# Patient Record
Sex: FEMALE | Race: WHITE | Employment: FULL TIME | URBAN - METROPOLITAN AREA
[De-identification: names, ages, dates, MRNs, and addresses within clinical notes are randomized per-mention and may not be internally consistent; named-entity substitution may affect disease eponyms.]

---

## 2019-03-03 ENCOUNTER — APPOINTMENT (OUTPATIENT)
Dept: GENERAL RADIOLOGY | Age: 24
End: 2019-03-03
Payer: COMMERCIAL

## 2019-03-03 ENCOUNTER — HOSPITAL ENCOUNTER (EMERGENCY)
Age: 24
Discharge: HOME | End: 2019-03-03
Attending: EMERGENCY MEDICINE
Payer: COMMERCIAL

## 2019-03-03 VITALS
WEIGHT: 180 LBS | RESPIRATION RATE: 18 BRPM | TEMPERATURE: 97.2 F | OXYGEN SATURATION: 95 % | HEART RATE: 89 BPM | SYSTOLIC BLOOD PRESSURE: 115 MMHG | DIASTOLIC BLOOD PRESSURE: 68 MMHG

## 2019-03-03 DIAGNOSIS — J40 BRONCHITIS: Primary | ICD-10-CM

## 2019-03-03 LAB
DIRECT EXAM: NORMAL
Lab: NORMAL
SPECIMEN DESCRIPTION: NORMAL

## 2019-03-03 PROCEDURE — 87804 INFLUENZA ASSAY W/OPTIC: CPT

## 2019-03-03 PROCEDURE — 71046 X-RAY EXAM CHEST 2 VIEWS: CPT

## 2019-03-03 PROCEDURE — 99283 EMERGENCY DEPT VISIT LOW MDM: CPT

## 2019-03-03 RX ORDER — AZITHROMYCIN 250 MG/1
TABLET, FILM COATED ORAL
Qty: 1 PACKET | Refills: 0 | Status: SHIPPED | OUTPATIENT
Start: 2019-03-03 | End: 2019-03-13

## 2019-03-03 RX ORDER — BENZONATATE 100 MG/1
100 CAPSULE ORAL 3 TIMES DAILY PRN
Qty: 15 CAPSULE | Refills: 0 | Status: SHIPPED | OUTPATIENT
Start: 2019-03-03 | End: 2019-03-08

## 2019-03-04 ASSESSMENT — ENCOUNTER SYMPTOMS
ABDOMINAL PAIN: 0
SHORTNESS OF BREATH: 0
NAUSEA: 0
VOMITING: 0

## 2019-05-13 ENCOUNTER — APPOINTMENT (OUTPATIENT)
Dept: GENERAL RADIOLOGY | Age: 24
End: 2019-05-13
Payer: COMMERCIAL

## 2019-05-13 ENCOUNTER — HOSPITAL ENCOUNTER (EMERGENCY)
Age: 24
Discharge: HOME | End: 2019-05-13
Attending: EMERGENCY MEDICINE
Payer: COMMERCIAL

## 2019-05-13 VITALS
OXYGEN SATURATION: 96 % | HEART RATE: 98 BPM | SYSTOLIC BLOOD PRESSURE: 110 MMHG | TEMPERATURE: 99 F | HEIGHT: 61 IN | WEIGHT: 172 LBS | DIASTOLIC BLOOD PRESSURE: 75 MMHG | BODY MASS INDEX: 32.47 KG/M2

## 2019-05-13 DIAGNOSIS — J06.9 VIRAL UPPER RESPIRATORY INFECTION: Primary | ICD-10-CM

## 2019-05-13 LAB
DIRECT EXAM: NORMAL
Lab: NORMAL
SPECIMEN DESCRIPTION: NORMAL

## 2019-05-13 PROCEDURE — 87880 STREP A ASSAY W/OPTIC: CPT

## 2019-05-13 PROCEDURE — 71046 X-RAY EXAM CHEST 2 VIEWS: CPT

## 2019-05-13 PROCEDURE — 99284 EMERGENCY DEPT VISIT MOD MDM: CPT

## 2019-05-13 RX ORDER — ACETAMINOPHEN 325 MG/1
650 TABLET ORAL EVERY 6 HOURS PRN
Qty: 20 TABLET | Refills: 0 | Status: SHIPPED | OUTPATIENT
Start: 2019-05-13 | End: 2019-09-11

## 2019-05-13 RX ORDER — IBUPROFEN 600 MG/1
600 TABLET ORAL EVERY 6 HOURS PRN
Qty: 20 TABLET | Refills: 0 | Status: SHIPPED | OUTPATIENT
Start: 2019-05-13 | End: 2019-09-11

## 2019-05-13 RX ORDER — DOCUSATE SODIUM 100 MG/1
100 CAPSULE, LIQUID FILLED ORAL 2 TIMES DAILY PRN
Qty: 14 CAPSULE | Refills: 0 | Status: SHIPPED | OUTPATIENT
Start: 2019-05-13 | End: 2019-09-11

## 2019-05-13 ASSESSMENT — ENCOUNTER SYMPTOMS
COLOR CHANGE: 0
VOMITING: 0
SORE THROAT: 1
NAUSEA: 0
COUGH: 1
ABDOMINAL PAIN: 0
RHINORRHEA: 1
EYE REDNESS: 0
EYE PAIN: 0
SHORTNESS OF BREATH: 0
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
## 2019-08-05 ENCOUNTER — HOSPITAL ENCOUNTER (EMERGENCY)
Age: 24
Discharge: HOME | End: 2019-08-05
Attending: EMERGENCY MEDICINE
Payer: COMMERCIAL

## 2019-08-05 VITALS
BODY MASS INDEX: 34.01 KG/M2 | WEIGHT: 180 LBS | OXYGEN SATURATION: 98 % | DIASTOLIC BLOOD PRESSURE: 70 MMHG | TEMPERATURE: 98.2 F | RESPIRATION RATE: 16 BRPM | HEART RATE: 70 BPM | SYSTOLIC BLOOD PRESSURE: 130 MMHG

## 2019-08-05 DIAGNOSIS — R10.9 ABDOMINAL CRAMPING: Primary | ICD-10-CM

## 2019-08-05 LAB
-: ABNORMAL
AMORPHOUS: ABNORMAL
BACTERIA: ABNORMAL
BILIRUBIN URINE: NEGATIVE
CASTS UA: ABNORMAL /LPF (ref 0–8)
COLOR: YELLOW
COMMENT UA: ABNORMAL
CRYSTALS, UA: ABNORMAL /HPF
EPITHELIAL CELLS UA: ABNORMAL /HPF (ref 0–5)
GLUCOSE URINE: NEGATIVE
HCG QUANTITATIVE: ABNORMAL IU/L
KETONES, URINE: ABNORMAL
LEUKOCYTE ESTERASE, URINE: NEGATIVE
MUCUS: ABNORMAL
NITRITE, URINE: NEGATIVE
OTHER OBSERVATIONS UA: ABNORMAL
PH UA: 6 (ref 5–8)
PROTEIN UA: NEGATIVE
RBC UA: ABNORMAL /HPF (ref 0–4)
RENAL EPITHELIAL, UA: ABNORMAL /HPF
SPECIFIC GRAVITY UA: 1.03 (ref 1–1.03)
TRICHOMONAS: ABNORMAL
TURBIDITY: ABNORMAL
URINE HGB: NEGATIVE
UROBILINOGEN, URINE: NORMAL
WBC UA: ABNORMAL /HPF (ref 0–5)
YEAST: ABNORMAL

## 2019-08-05 PROCEDURE — 99284 EMERGENCY DEPT VISIT MOD MDM: CPT

## 2019-08-05 PROCEDURE — 84702 CHORIONIC GONADOTROPIN TEST: CPT

## 2019-08-05 PROCEDURE — 81001 URINALYSIS AUTO W/SCOPE: CPT

## 2019-08-05 PROCEDURE — 87086 URINE CULTURE/COLONY COUNT: CPT

## 2019-08-05 RX ORDER — PNV NO.95/FERROUS FUM/FOLIC AC 28MG-0.8MG
1 TABLET ORAL DAILY
Qty: 30 TABLET | Refills: 0 | Status: SHIPPED | OUTPATIENT
Start: 2019-08-05 | End: 2019-08-05 | Stop reason: SDUPTHER

## 2019-08-05 RX ORDER — PNV NO.95/FERROUS FUM/FOLIC AC 28MG-0.8MG
1 TABLET ORAL DAILY
Qty: 30 TABLET | Refills: 0 | Status: SHIPPED | OUTPATIENT
Start: 2019-08-05 | End: 2019-10-03 | Stop reason: ALTCHOICE

## 2019-08-05 RX ORDER — DIPHENHYDRAMINE HYDROCHLORIDE 25 MG/1
25 CAPSULE ORAL NIGHTLY PRN
Qty: 30 TABLET | Refills: 0 | Status: SHIPPED | OUTPATIENT
Start: 2019-08-05 | End: 2019-09-11

## 2019-08-05 RX ORDER — DIPHENHYDRAMINE HYDROCHLORIDE 25 MG/1
25 CAPSULE ORAL NIGHTLY PRN
Qty: 30 TABLET | Refills: 0 | Status: SHIPPED | OUTPATIENT
Start: 2019-08-05 | End: 2019-08-05 | Stop reason: SDUPTHER

## 2019-08-05 ASSESSMENT — ENCOUNTER SYMPTOMS
SORE THROAT: 0
COLOR CHANGE: 0
BACK PAIN: 0
VOMITING: 0
ABDOMINAL PAIN: 1
RHINORRHEA: 0
COUGH: 0
NAUSEA: 0
EYE DISCHARGE: 0
WHEEZING: 0
EYE PAIN: 0
EYE ITCHING: 0

## 2019-08-05 ASSESSMENT — PAIN DESCRIPTION - PAIN TYPE: TYPE: ACUTE PAIN

## 2019-08-05 ASSESSMENT — PAIN DESCRIPTION - FREQUENCY: FREQUENCY: CONTINUOUS

## 2019-08-05 ASSESSMENT — PAIN DESCRIPTION - ORIENTATION: ORIENTATION: LOWER

## 2019-08-05 ASSESSMENT — PAIN SCALES - GENERAL: PAINLEVEL_OUTOF10: 5

## 2019-08-05 ASSESSMENT — PAIN DESCRIPTION - ONSET: ONSET: ON-GOING

## 2019-08-05 ASSESSMENT — PAIN DESCRIPTION - DESCRIPTORS: DESCRIPTORS: CRAMPING

## 2019-08-05 ASSESSMENT — PAIN DESCRIPTION - LOCATION: LOCATION: ABDOMEN

## 2019-08-06 LAB
CULTURE: NORMAL
Lab: NORMAL
SPECIMEN DESCRIPTION: NORMAL

## 2019-08-06 NOTE — ED PROVIDER NOTES
I performed a history and physical examination of the patient and discussed management with the resident. I reviewed the residents note and agree with the documented findings and plan of care. Any areas of disagreement are noted on the chart. I was personally present for the key portions of any procedures. I have documented in the chart those procedures where I was not present during the key portions. I have reviewed the emergency nurses triage note. I agree with the chief complaint, past medical history, past surgical history, allergies, medications, social and family history as documented unless otherwise noted below. Documentation of the HPI, Physical Exam and Medical Decision Making performed by medical students or scribes is based on my personal performance of the HPI, PE and MDM. For Phys Assistant/ Nurse Practitioner cases/documentation I have personally evaluated this patient and have completed at least one if not all key elements of the E/M (history, physical exam, and MDM). I find the patient's history and physical exam are consistent with the NP/PA documentation. I agree with the care provided, treatment rendered, disposition and followup plan. Additional findings are as noted. Ralph Christianson. Jose Gonsalez MD  Attending Emergency  Physician    C/O INTERMITTENT MILD LOWER ABD CRAMPING, NONE CURRENTLY. HX OF PROB EARLY PREGNANCY-UNSURE OF LMP, BUT SUSPECTS IT MAY HAVE BEEN LATE . . NO HX OF TUBAL SURGERY/MANIPULATION, PID. NO NAUSEA, VOMITING, FEVER, CHILLS, UTI SX. NO VAG BLEEDING/DISCHARGE. NO SX AT TIME OF MY EVAL. AWAKE, ALERT, COOP, RESP. LUNGS CLEAR BERNARDO. NO RALES, RHONCHI, WHEEZES, STRIDOR, RETRACTIONS. CARDIAC-S1S2, RRR, NO MRG. ABD SOFT, NONDISTENDED, NONTENDER. NORMAL BOWEL SOUNDS. PATIENT DECLINED PELVIC EXAM.   FIRST PRENATAL VISIT WITH OBGYN IN [place]. POCUS-GESTATIONAL SAC? PROB IUP. IMP-PREGNANCY. PLAN-DISCHARGE, RX PRENATALVITAMINS, DICLEGIS COMPONENTS.  F/U WITH OB-CALL IN AM. RETURN IF SX WORSEN, RECUR, PROGRESS. Aurelia Moran MD  08/05/19 2018  [id number]   UNREMARKABLE.       Aurelia Moran MD  08/05/19 2052

## 2019-09-11 ENCOUNTER — INITIAL PRENATAL (OUTPATIENT)
Dept: OBGYN CLINIC | Age: 24
End: 2019-09-11
Payer: COMMERCIAL

## 2019-09-11 ENCOUNTER — PROCEDURE VISIT (OUTPATIENT)
Dept: OBGYN CLINIC | Age: 24
End: 2019-09-11
Payer: COMMERCIAL

## 2019-09-11 ENCOUNTER — HOSPITAL ENCOUNTER (OUTPATIENT)
Age: 24
Setting detail: SPECIMEN
Discharge: HOME | End: 2019-09-11
Payer: COMMERCIAL

## 2019-09-11 VITALS
BODY MASS INDEX: 31.74 KG/M2 | HEART RATE: 86 BPM | SYSTOLIC BLOOD PRESSURE: 114 MMHG | WEIGHT: 168 LBS | DIASTOLIC BLOOD PRESSURE: 70 MMHG

## 2019-09-11 DIAGNOSIS — Z98.890 HISTORY OF CRYOSURGERY OF CERVIX AFFECTING PREGNANCY, ANTEPARTUM: ICD-10-CM

## 2019-09-11 DIAGNOSIS — F32.A DEPRESSION, UNSPECIFIED DEPRESSION TYPE: ICD-10-CM

## 2019-09-11 DIAGNOSIS — Z34.90 EARLY STAGE OF PREGNANCY: ICD-10-CM

## 2019-09-11 DIAGNOSIS — F43.10 PTSD (POST-TRAUMATIC STRESS DISORDER): ICD-10-CM

## 2019-09-11 DIAGNOSIS — Z90.81 H/O SPLENECTOMY: ICD-10-CM

## 2019-09-11 DIAGNOSIS — Z87.828 HISTORY OF GUNSHOT WOUND: ICD-10-CM

## 2019-09-11 DIAGNOSIS — Z34.90 EARLY STAGE OF PREGNANCY: Primary | ICD-10-CM

## 2019-09-11 DIAGNOSIS — O34.40 HISTORY OF CRYOSURGERY OF CERVIX AFFECTING PREGNANCY, ANTEPARTUM: ICD-10-CM

## 2019-09-11 LAB
ABO/RH: NORMAL
ABSOLUTE EOS #: 0.2 K/UL (ref 0–0.4)
ABSOLUTE IMMATURE GRANULOCYTE: ABNORMAL K/UL (ref 0–0.3)
ABSOLUTE LYMPH #: 4.7 K/UL (ref 1–4.8)
ABSOLUTE MONO #: 1.5 K/UL (ref 0.1–1.3)
ANTIBODY SCREEN: NEGATIVE
BASOPHILS # BLD: 1 % (ref 0–2)
BASOPHILS ABSOLUTE: 0.2 K/UL (ref 0–0.2)
BILIRUBIN URINE: NEGATIVE
BLOOD BANK COMMENT: NORMAL
COLOR: YELLOW
COMMENT UA: NORMAL
CRL: NORMAL CM
DIFFERENTIAL TYPE: ABNORMAL
EOSINOPHILS RELATIVE PERCENT: 2 % (ref 0–4)
GLUCOSE BLD-MCNC: 89 MG/DL (ref 70–99)
GLUCOSE URINE: NEGATIVE
HCG QUANTITATIVE: ABNORMAL IU/L
HCT VFR BLD CALC: 40.6 % (ref 36–46)
HEMOGLOBIN: 13.5 G/DL (ref 12–16)
HEPATITIS B SURFACE ANTIGEN: NONREACTIVE
HIV AG/AB: NONREACTIVE
IMMATURE GRANULOCYTES: ABNORMAL %
KETONES, URINE: NEGATIVE
LEUKOCYTE ESTERASE, URINE: NEGATIVE
LYMPHOCYTES # BLD: 31 % (ref 24–44)
MCH RBC QN AUTO: 29.5 PG (ref 26–34)
MCHC RBC AUTO-ENTMCNC: 33.3 G/DL (ref 31–37)
MCV RBC AUTO: 88.5 FL (ref 80–100)
MONOCYTES # BLD: 9 % (ref 1–7)
NITRITE, URINE: NEGATIVE
NRBC AUTOMATED: ABNORMAL PER 100 WBC
PDW BLD-RTO: 13.3 % (ref 11.5–14.9)
PH UA: 6 (ref 5–8)
PLATELET # BLD: 344 K/UL (ref 150–450)
PLATELET ESTIMATE: ABNORMAL
PMV BLD AUTO: 10.5 FL (ref 6–12)
PROTEIN UA: NEGATIVE
RBC # BLD: 4.59 M/UL (ref 4–5.2)
RBC # BLD: ABNORMAL 10*6/UL
RUBV IGG SER QL: 83.8 IU/ML
SAC DIAMETER: NORMAL CM
SEG NEUTROPHILS: 57 % (ref 36–66)
SEGMENTED NEUTROPHILS ABSOLUTE COUNT: 8.9 K/UL (ref 1.3–9.1)
SPECIFIC GRAVITY UA: 1.02 (ref 1–1.03)
T. PALLIDUM, IGG: NONREACTIVE
TSH SERPL DL<=0.05 MIU/L-ACNC: 1.72 MIU/L (ref 0.3–5)
TURBIDITY: CLEAR
URINE HGB: NEGATIVE
UROBILINOGEN, URINE: NORMAL
WBC # BLD: 15.5 K/UL (ref 3.5–11)
WBC # BLD: ABNORMAL 10*3/UL

## 2019-09-11 PROCEDURE — 86900 BLOOD TYPING SEROLOGIC ABO: CPT

## 2019-09-11 PROCEDURE — 87491 CHLMYD TRACH DNA AMP PROBE: CPT

## 2019-09-11 PROCEDURE — 99213 OFFICE O/P EST LOW 20 MIN: CPT | Performed by: NURSE PRACTITIONER

## 2019-09-11 PROCEDURE — 86850 RBC ANTIBODY SCREEN: CPT

## 2019-09-11 PROCEDURE — 81220 CFTR GENE COM VARIANTS: CPT

## 2019-09-11 PROCEDURE — 84702 CHORIONIC GONADOTROPIN TEST: CPT

## 2019-09-11 PROCEDURE — 82947 ASSAY GLUCOSE BLOOD QUANT: CPT

## 2019-09-11 PROCEDURE — 87340 HEPATITIS B SURFACE AG IA: CPT

## 2019-09-11 PROCEDURE — 87070 CULTURE OTHR SPECIMN AEROBIC: CPT

## 2019-09-11 PROCEDURE — 76801 OB US < 14 WKS SINGLE FETUS: CPT | Performed by: OBSTETRICS & GYNECOLOGY

## 2019-09-11 PROCEDURE — 84443 ASSAY THYROID STIM HORMONE: CPT

## 2019-09-11 PROCEDURE — 87591 N.GONORRHOEAE DNA AMP PROB: CPT

## 2019-09-11 PROCEDURE — 85025 COMPLETE CBC W/AUTO DIFF WBC: CPT

## 2019-09-11 PROCEDURE — 86780 TREPONEMA PALLIDUM: CPT

## 2019-09-11 PROCEDURE — 81003 URINALYSIS AUTO W/O SCOPE: CPT

## 2019-09-11 PROCEDURE — 86901 BLOOD TYPING SEROLOGIC RH(D): CPT

## 2019-09-11 PROCEDURE — 87389 HIV-1 AG W/HIV-1&-2 AB AG IA: CPT

## 2019-09-11 PROCEDURE — 36415 COLL VENOUS BLD VENIPUNCTURE: CPT

## 2019-09-11 PROCEDURE — G0145 SCR C/V CYTO,THINLAYER,RESCR: HCPCS

## 2019-09-11 PROCEDURE — 86762 RUBELLA ANTIBODY: CPT

## 2019-09-12 LAB
C TRACH DNA GENITAL QL NAA+PROBE: NEGATIVE
N. GONORRHOEAE DNA: NEGATIVE
SPECIMEN DESCRIPTION: NORMAL

## 2019-09-14 LAB
CULTURE: NORMAL
CULTURE: NORMAL
Lab: NORMAL
SPECIMEN DESCRIPTION: NORMAL

## 2019-09-16 LAB — CYSTIC FIBROSIS: NORMAL

## 2019-09-20 LAB — CYTOLOGY REPORT: NORMAL

## 2019-09-23 ENCOUNTER — TELEPHONE (OUTPATIENT)
Dept: OBGYN CLINIC | Age: 24
End: 2019-09-23

## 2019-09-24 ENCOUNTER — TELEPHONE (OUTPATIENT)
Dept: OBGYN CLINIC | Age: 24
End: 2019-09-24

## 2019-09-24 ENCOUNTER — ROUTINE PRENATAL (OUTPATIENT)
Dept: PERINATAL CARE | Age: 24
End: 2019-09-24
Payer: COMMERCIAL

## 2019-09-24 VITALS
HEIGHT: 61 IN | RESPIRATION RATE: 16 BRPM | SYSTOLIC BLOOD PRESSURE: 105 MMHG | BODY MASS INDEX: 33.04 KG/M2 | WEIGHT: 175 LBS | HEART RATE: 68 BPM | TEMPERATURE: 98.3 F | DIASTOLIC BLOOD PRESSURE: 56 MMHG

## 2019-09-24 DIAGNOSIS — O99.211 OBESITY AFFECTING PREGNANCY IN FIRST TRIMESTER: ICD-10-CM

## 2019-09-24 DIAGNOSIS — O36.80X0 ENCOUNTER TO DETERMINE FETAL VIABILITY OF PREGNANCY, SINGLE OR UNSPECIFIED FETUS: ICD-10-CM

## 2019-09-24 DIAGNOSIS — Z36.9 FIRST TRIMESTER SCREENING: Primary | ICD-10-CM

## 2019-09-24 DIAGNOSIS — Z3A.13 13 WEEKS GESTATION OF PREGNANCY: ICD-10-CM

## 2019-09-24 LAB
CRL: NORMAL CM
SAC DIAMETER: NORMAL CM

## 2019-09-24 PROCEDURE — 76801 OB US < 14 WKS SINGLE FETUS: CPT | Performed by: OBSTETRICS & GYNECOLOGY

## 2019-09-24 PROCEDURE — 76813 OB US NUCHAL MEAS 1 GEST: CPT | Performed by: OBSTETRICS & GYNECOLOGY

## 2019-09-26 ENCOUNTER — TELEPHONE (OUTPATIENT)
Dept: PERINATAL CARE | Age: 24
End: 2019-09-26

## 2019-09-30 DIAGNOSIS — O28.9 ABNORMAL FIRST TRIMESTER SCREEN: Primary | ICD-10-CM

## 2019-10-03 ENCOUNTER — ROUTINE PRENATAL (OUTPATIENT)
Dept: PERINATAL CARE | Age: 24
End: 2019-10-03
Payer: COMMERCIAL

## 2019-10-03 ENCOUNTER — HOSPITAL ENCOUNTER (OUTPATIENT)
Age: 24
Discharge: HOME | End: 2019-10-03
Payer: COMMERCIAL

## 2019-10-03 VITALS
HEIGHT: 61 IN | HEART RATE: 72 BPM | DIASTOLIC BLOOD PRESSURE: 64 MMHG | SYSTOLIC BLOOD PRESSURE: 100 MMHG | WEIGHT: 177 LBS | BODY MASS INDEX: 33.42 KG/M2 | TEMPERATURE: 97.6 F | RESPIRATION RATE: 16 BRPM

## 2019-10-03 DIAGNOSIS — O99.212 OBESITY AFFECTING PREGNANCY IN SECOND TRIMESTER: ICD-10-CM

## 2019-10-03 DIAGNOSIS — O99.891 CURRENT MATERNAL CONDITION AFFECTING PREGNANCY: ICD-10-CM

## 2019-10-03 DIAGNOSIS — O28.0 LOW MATERNAL SERUM HUMAN CHORIONIC GONADOTROPIN (HCG): Primary | ICD-10-CM

## 2019-10-03 DIAGNOSIS — Z3A.15 15 WEEKS GESTATION OF PREGNANCY: ICD-10-CM

## 2019-10-03 DIAGNOSIS — O28.9 ABNORMAL FIRST TRIMESTER SCREEN: ICD-10-CM

## 2019-10-03 PROCEDURE — G8484 FLU IMMUNIZE NO ADMIN: HCPCS | Performed by: OBSTETRICS & GYNECOLOGY

## 2019-10-03 PROCEDURE — 36415 COLL VENOUS BLD VENIPUNCTURE: CPT

## 2019-10-03 PROCEDURE — 99243 OFF/OP CNSLTJ NEW/EST LOW 30: CPT | Performed by: OBSTETRICS & GYNECOLOGY

## 2019-10-03 PROCEDURE — G8417 CALC BMI ABV UP PARAM F/U: HCPCS | Performed by: OBSTETRICS & GYNECOLOGY

## 2019-10-03 PROCEDURE — 82105 ALPHA-FETOPROTEIN SERUM: CPT

## 2019-10-03 PROCEDURE — G8427 DOCREV CUR MEDS BY ELIG CLIN: HCPCS | Performed by: OBSTETRICS & GYNECOLOGY

## 2019-10-03 RX ORDER — VITAMIN A, VITAMIN C, VITAMIN D-3, VITAMIN E, VITAMIN B-1, VITAMIN B-2, NIACIN, VITAMIN B-6, CALCIUM, IRON, ZINC, COPPER 4000; 120; 400; 22; 1.84; 3; 20; 10; 1; 12; 200; 27; 25; 2 [IU]/1; MG/1; [IU]/1; MG/1; MG/1; MG/1; MG/1; MG/1; MG/1; UG/1; MG/1; MG/1; MG/1; MG/1
TABLET ORAL
Refills: 1 | COMMUNITY
Start: 2019-09-11 | End: 2019-12-24 | Stop reason: SDUPTHER

## 2019-10-04 LAB
SEND OUT REPORT: NORMAL
TEST NAME: NORMAL

## 2019-10-06 LAB
AFP INTERPRETATION: NORMAL
AFP MOM: 2.2
AFP SPECIMEN: NORMAL
AFP: 55 NG/ML
DATE OF BIRTH: NORMAL
DATING METHOD: NORMAL
DETERMINED BY: NORMAL
DIABETIC: NEGATIVE
DUE DATE: NORMAL
ESTIMATED DUE DATE: NORMAL
FAMILY HISTORY NTD: NEGATIVE
GESTATIONAL AGE: NORMAL
INSULIN REQ DIABETES: NO
LAST MENSTRUAL PERIOD: NORMAL
MATERNAL AGE AT EDD: 25.4 YR
MATERNAL WEIGHT: 177
MONOCHORIONIC TWINS: NORMAL
NUMBER OF FETUSES: NORMAL
PATIENT WEIGHT UNITS: NORMAL
PATIENT WEIGHT: NORMAL
RACE (MATERNAL): NORMAL
RACE: NORMAL
REPEAT SPECIMEN?: NORMAL
SMOKING: NORMAL
SMOKING: NORMAL
VALPROIC/CARBAMAZEP: NORMAL
ZZ NTE CLEAN UP: HISTORY: NO

## 2019-10-09 ENCOUNTER — ROUTINE PRENATAL (OUTPATIENT)
Dept: OBGYN CLINIC | Age: 24
End: 2019-10-09
Payer: COMMERCIAL

## 2019-10-09 VITALS
BODY MASS INDEX: 32.5 KG/M2 | SYSTOLIC BLOOD PRESSURE: 116 MMHG | WEIGHT: 172 LBS | HEART RATE: 71 BPM | DIASTOLIC BLOOD PRESSURE: 72 MMHG

## 2019-10-09 DIAGNOSIS — Z3A.15 15 WEEKS GESTATION OF PREGNANCY: Primary | ICD-10-CM

## 2019-10-09 DIAGNOSIS — Z98.890 HISTORY OF CRYOSURGERY OF CERVIX AFFECTING PREGNANCY, ANTEPARTUM: ICD-10-CM

## 2019-10-09 DIAGNOSIS — Z90.81 H/O SPLENECTOMY: ICD-10-CM

## 2019-10-09 DIAGNOSIS — Z87.828 HISTORY OF GUNSHOT WOUND: ICD-10-CM

## 2019-10-09 DIAGNOSIS — Z23 NEED FOR INFLUENZA VACCINATION: ICD-10-CM

## 2019-10-09 DIAGNOSIS — O34.40 HISTORY OF CRYOSURGERY OF CERVIX AFFECTING PREGNANCY, ANTEPARTUM: ICD-10-CM

## 2019-10-09 PROCEDURE — G8427 DOCREV CUR MEDS BY ELIG CLIN: HCPCS | Performed by: ADVANCED PRACTICE MIDWIFE

## 2019-10-09 PROCEDURE — 99213 OFFICE O/P EST LOW 20 MIN: CPT | Performed by: ADVANCED PRACTICE MIDWIFE

## 2019-10-09 PROCEDURE — 1036F TOBACCO NON-USER: CPT | Performed by: ADVANCED PRACTICE MIDWIFE

## 2019-10-09 PROCEDURE — G8484 FLU IMMUNIZE NO ADMIN: HCPCS | Performed by: ADVANCED PRACTICE MIDWIFE

## 2019-10-09 PROCEDURE — 90471 IMMUNIZATION ADMIN: CPT | Performed by: ADVANCED PRACTICE MIDWIFE

## 2019-10-09 PROCEDURE — 90686 IIV4 VACC NO PRSV 0.5 ML IM: CPT | Performed by: ADVANCED PRACTICE MIDWIFE

## 2019-10-09 PROCEDURE — G8417 CALC BMI ABV UP PARAM F/U: HCPCS | Performed by: ADVANCED PRACTICE MIDWIFE

## 2019-10-09 RX ORDER — METRONIDAZOLE 500 MG/1
500 TABLET ORAL 2 TIMES DAILY
Qty: 14 TABLET | Refills: 0 | Status: SHIPPED | OUTPATIENT
Start: 2019-10-09 | End: 2019-10-16

## 2019-10-11 ENCOUNTER — HOSPITAL ENCOUNTER (EMERGENCY)
Age: 24
Discharge: HOME | End: 2019-10-11
Attending: EMERGENCY MEDICINE
Payer: COMMERCIAL

## 2019-10-11 VITALS
SYSTOLIC BLOOD PRESSURE: 120 MMHG | TEMPERATURE: 97.5 F | BODY MASS INDEX: 33.07 KG/M2 | WEIGHT: 175 LBS | OXYGEN SATURATION: 100 % | HEART RATE: 72 BPM | DIASTOLIC BLOOD PRESSURE: 68 MMHG | RESPIRATION RATE: 18 BRPM

## 2019-10-11 DIAGNOSIS — M79.10 MYALGIA: Primary | ICD-10-CM

## 2019-10-11 LAB
-: ABNORMAL
AMORPHOUS: ABNORMAL
BACTERIA: ABNORMAL
BILIRUBIN URINE: NEGATIVE
CASTS UA: ABNORMAL /LPF (ref 0–2)
CASTS UA: ABNORMAL /LPF (ref 0–2)
COLOR: YELLOW
CRYSTALS, UA: ABNORMAL /HPF
CRYSTALS, UA: ABNORMAL /HPF
EPITHELIAL CELLS UA: ABNORMAL /HPF (ref 0–5)
GLUCOSE URINE: NEGATIVE
KETONES, URINE: NEGATIVE
LEUKOCYTE ESTERASE, URINE: NEGATIVE
MUCUS: ABNORMAL
NITRITE, URINE: NEGATIVE
OTHER OBSERVATIONS UA: ABNORMAL
PH UA: 5.5 (ref 5–8)
PROTEIN UA: NEGATIVE
RBC UA: ABNORMAL /HPF (ref 0–2)
RENAL EPITHELIAL, UA: ABNORMAL /HPF
SPECIFIC GRAVITY UA: 1.03 (ref 1–1.03)
TRICHOMONAS: ABNORMAL
TURBIDITY: CLEAR
URINE HGB: NEGATIVE
UROBILINOGEN, URINE: NORMAL
WBC UA: ABNORMAL /HPF (ref 0–5)
YEAST: ABNORMAL

## 2019-10-11 PROCEDURE — 99284 EMERGENCY DEPT VISIT MOD MDM: CPT

## 2019-10-11 PROCEDURE — 87086 URINE CULTURE/COLONY COUNT: CPT

## 2019-10-11 PROCEDURE — 81001 URINALYSIS AUTO W/SCOPE: CPT

## 2019-10-11 PROCEDURE — 6370000000 HC RX 637 (ALT 250 FOR IP): Performed by: STUDENT IN AN ORGANIZED HEALTH CARE EDUCATION/TRAINING PROGRAM

## 2019-10-11 RX ORDER — ACETAMINOPHEN 500 MG
1000 TABLET ORAL EVERY 6 HOURS PRN
Qty: 30 TABLET | Refills: 0 | Status: SHIPPED | OUTPATIENT
Start: 2019-10-11 | End: 2020-01-11 | Stop reason: ALTCHOICE

## 2019-10-11 RX ORDER — ACETAMINOPHEN 325 MG/1
650 TABLET ORAL ONCE
Status: COMPLETED | OUTPATIENT
Start: 2019-10-11 | End: 2019-10-11

## 2019-10-11 RX ADMIN — ACETAMINOPHEN 650 MG: 325 TABLET ORAL at 16:43

## 2019-10-11 ASSESSMENT — ENCOUNTER SYMPTOMS
COUGH: 0
SHORTNESS OF BREATH: 0
VOMITING: 1
NAUSEA: 1
SORE THROAT: 0
ABDOMINAL PAIN: 1

## 2019-10-11 ASSESSMENT — PAIN DESCRIPTION - ORIENTATION: ORIENTATION: LOWER

## 2019-10-11 ASSESSMENT — PAIN SCALES - GENERAL
PAINLEVEL_OUTOF10: 5
PAINLEVEL_OUTOF10: 5

## 2019-10-11 ASSESSMENT — PAIN DESCRIPTION - DESCRIPTORS: DESCRIPTORS: ACHING

## 2019-10-11 ASSESSMENT — PAIN DESCRIPTION - PAIN TYPE: TYPE: ACUTE PAIN

## 2019-10-11 ASSESSMENT — PAIN DESCRIPTION - LOCATION: LOCATION: ABDOMEN

## 2019-10-12 LAB
CULTURE: NORMAL
Lab: NORMAL
SPECIMEN DESCRIPTION: NORMAL

## 2019-10-22 ENCOUNTER — TELEPHONE (OUTPATIENT)
Dept: OBGYN CLINIC | Age: 24
End: 2019-10-22

## 2019-10-22 RX ORDER — ONDANSETRON 4 MG/1
4 TABLET, FILM COATED ORAL EVERY 8 HOURS PRN
Qty: 30 TABLET | Refills: 1 | Status: SHIPPED | OUTPATIENT
Start: 2019-10-22 | End: 2019-12-24

## 2019-10-28 ENCOUNTER — ROUTINE PRENATAL (OUTPATIENT)
Dept: OBGYN CLINIC | Age: 24
End: 2019-10-28
Payer: COMMERCIAL

## 2019-10-28 VITALS
BODY MASS INDEX: 32.69 KG/M2 | HEART RATE: 78 BPM | DIASTOLIC BLOOD PRESSURE: 70 MMHG | WEIGHT: 173 LBS | SYSTOLIC BLOOD PRESSURE: 114 MMHG

## 2019-10-28 DIAGNOSIS — Z90.81 H/O SPLENECTOMY: ICD-10-CM

## 2019-10-28 DIAGNOSIS — Z98.890 HISTORY OF CRYOSURGERY OF CERVIX AFFECTING PREGNANCY, ANTEPARTUM: ICD-10-CM

## 2019-10-28 DIAGNOSIS — Z3A.18 18 WEEKS GESTATION OF PREGNANCY: ICD-10-CM

## 2019-10-28 DIAGNOSIS — O09.92 ENCOUNTER FOR SUPERVISION OF HIGH RISK PREGNANCY IN SECOND TRIMESTER, ANTEPARTUM: Primary | ICD-10-CM

## 2019-10-28 DIAGNOSIS — Z87.828 HISTORY OF GUNSHOT WOUND: ICD-10-CM

## 2019-10-28 DIAGNOSIS — O34.40 HISTORY OF CRYOSURGERY OF CERVIX AFFECTING PREGNANCY, ANTEPARTUM: ICD-10-CM

## 2019-10-28 PROCEDURE — G8482 FLU IMMUNIZE ORDER/ADMIN: HCPCS | Performed by: CLINICAL NURSE SPECIALIST

## 2019-10-28 PROCEDURE — G8427 DOCREV CUR MEDS BY ELIG CLIN: HCPCS | Performed by: CLINICAL NURSE SPECIALIST

## 2019-10-28 PROCEDURE — 1036F TOBACCO NON-USER: CPT | Performed by: CLINICAL NURSE SPECIALIST

## 2019-10-28 PROCEDURE — G8417 CALC BMI ABV UP PARAM F/U: HCPCS | Performed by: CLINICAL NURSE SPECIALIST

## 2019-10-28 PROCEDURE — 99213 OFFICE O/P EST LOW 20 MIN: CPT | Performed by: CLINICAL NURSE SPECIALIST

## 2019-11-26 ENCOUNTER — ROUTINE PRENATAL (OUTPATIENT)
Dept: OBGYN CLINIC | Age: 24
End: 2019-11-26
Payer: COMMERCIAL

## 2019-11-26 VITALS
BODY MASS INDEX: 33.82 KG/M2 | DIASTOLIC BLOOD PRESSURE: 62 MMHG | SYSTOLIC BLOOD PRESSURE: 108 MMHG | WEIGHT: 179 LBS | HEART RATE: 76 BPM

## 2019-11-26 DIAGNOSIS — Z87.828 HISTORY OF GUNSHOT WOUND: ICD-10-CM

## 2019-11-26 DIAGNOSIS — Z98.890 HISTORY OF CRYOSURGERY OF CERVIX AFFECTING PREGNANCY, ANTEPARTUM: ICD-10-CM

## 2019-11-26 DIAGNOSIS — Z90.81 H/O SPLENECTOMY: ICD-10-CM

## 2019-11-26 DIAGNOSIS — O34.40 HISTORY OF CRYOSURGERY OF CERVIX AFFECTING PREGNANCY, ANTEPARTUM: ICD-10-CM

## 2019-11-26 PROCEDURE — 1036F TOBACCO NON-USER: CPT | Performed by: OBSTETRICS & GYNECOLOGY

## 2019-11-26 PROCEDURE — G8427 DOCREV CUR MEDS BY ELIG CLIN: HCPCS | Performed by: OBSTETRICS & GYNECOLOGY

## 2019-11-26 PROCEDURE — 99213 OFFICE O/P EST LOW 20 MIN: CPT | Performed by: OBSTETRICS & GYNECOLOGY

## 2019-11-26 PROCEDURE — G8482 FLU IMMUNIZE ORDER/ADMIN: HCPCS | Performed by: OBSTETRICS & GYNECOLOGY

## 2019-11-26 PROCEDURE — G8417 CALC BMI ABV UP PARAM F/U: HCPCS | Performed by: OBSTETRICS & GYNECOLOGY

## 2019-12-02 ENCOUNTER — TELEPHONE (OUTPATIENT)
Dept: OBGYN CLINIC | Age: 24
End: 2019-12-02

## 2019-12-02 RX ORDER — DOCUSATE SODIUM 100 MG/1
100 CAPSULE, LIQUID FILLED ORAL 2 TIMES DAILY
Qty: 60 CAPSULE | Refills: 0 | Status: SHIPPED | OUTPATIENT
Start: 2019-12-02 | End: 2019-12-24

## 2019-12-10 ENCOUNTER — ROUTINE PRENATAL (OUTPATIENT)
Dept: PERINATAL CARE | Age: 24
End: 2019-12-10
Payer: COMMERCIAL

## 2019-12-10 VITALS
HEIGHT: 61 IN | SYSTOLIC BLOOD PRESSURE: 112 MMHG | WEIGHT: 181 LBS | BODY MASS INDEX: 34.17 KG/M2 | TEMPERATURE: 97.9 F | DIASTOLIC BLOOD PRESSURE: 72 MMHG | HEART RATE: 76 BPM | RESPIRATION RATE: 16 BRPM

## 2019-12-10 DIAGNOSIS — O99.212 OBESITY AFFECTING PREGNANCY IN SECOND TRIMESTER: ICD-10-CM

## 2019-12-10 DIAGNOSIS — Z3A.24 24 WEEKS GESTATION OF PREGNANCY: ICD-10-CM

## 2019-12-10 DIAGNOSIS — O99.891 CURRENT MATERNAL CONDITION AFFECTING PREGNANCY: ICD-10-CM

## 2019-12-10 DIAGNOSIS — O28.9 ABNORMAL FIRST TRIMESTER SCREEN: ICD-10-CM

## 2019-12-10 DIAGNOSIS — O28.0 LOW MATERNAL SERUM HUMAN CHORIONIC GONADOTROPIN (HCG): Primary | ICD-10-CM

## 2019-12-10 PROCEDURE — 76820 UMBILICAL ARTERY ECHO: CPT | Performed by: OBSTETRICS & GYNECOLOGY

## 2019-12-10 PROCEDURE — 76811 OB US DETAILED SNGL FETUS: CPT | Performed by: OBSTETRICS & GYNECOLOGY

## 2019-12-24 ENCOUNTER — ROUTINE PRENATAL (OUTPATIENT)
Dept: OBGYN CLINIC | Age: 24
End: 2019-12-24
Payer: COMMERCIAL

## 2019-12-24 VITALS
HEART RATE: 78 BPM | DIASTOLIC BLOOD PRESSURE: 68 MMHG | BODY MASS INDEX: 33.82 KG/M2 | SYSTOLIC BLOOD PRESSURE: 112 MMHG | WEIGHT: 179 LBS

## 2019-12-24 DIAGNOSIS — Z3A.26 26 WEEKS GESTATION OF PREGNANCY: Primary | ICD-10-CM

## 2019-12-24 DIAGNOSIS — O34.40 HISTORY OF CRYOSURGERY OF CERVIX AFFECTING PREGNANCY, ANTEPARTUM: ICD-10-CM

## 2019-12-24 DIAGNOSIS — Z87.828 HISTORY OF GUNSHOT WOUND: ICD-10-CM

## 2019-12-24 DIAGNOSIS — Z90.81 H/O SPLENECTOMY: ICD-10-CM

## 2019-12-24 DIAGNOSIS — Z98.890 HISTORY OF CRYOSURGERY OF CERVIX AFFECTING PREGNANCY, ANTEPARTUM: ICD-10-CM

## 2019-12-24 PROCEDURE — 1036F TOBACCO NON-USER: CPT | Performed by: ADVANCED PRACTICE MIDWIFE

## 2019-12-24 PROCEDURE — 99213 OFFICE O/P EST LOW 20 MIN: CPT | Performed by: ADVANCED PRACTICE MIDWIFE

## 2019-12-24 PROCEDURE — G8482 FLU IMMUNIZE ORDER/ADMIN: HCPCS | Performed by: ADVANCED PRACTICE MIDWIFE

## 2019-12-24 PROCEDURE — G8427 DOCREV CUR MEDS BY ELIG CLIN: HCPCS | Performed by: ADVANCED PRACTICE MIDWIFE

## 2019-12-24 PROCEDURE — G8417 CALC BMI ABV UP PARAM F/U: HCPCS | Performed by: ADVANCED PRACTICE MIDWIFE

## 2019-12-24 RX ORDER — PRENATAL VIT,CAL 76/IRON/FOLIC 29 MG-1 MG
1 TABLET ORAL DAILY
Qty: 30 TABLET | Refills: 12 | Status: SHIPPED | OUTPATIENT
Start: 2019-12-24

## 2019-12-24 RX ORDER — METRONIDAZOLE 7.5 MG/G
GEL VAGINAL
Qty: 1 TUBE | Refills: 0 | Status: SHIPPED | OUTPATIENT
Start: 2019-12-24 | End: 2019-12-31

## 2019-12-30 ENCOUNTER — HOSPITAL ENCOUNTER (EMERGENCY)
Age: 24
Discharge: HOME | End: 2019-12-30
Attending: EMERGENCY MEDICINE
Payer: COMMERCIAL

## 2019-12-30 VITALS
WEIGHT: 179 LBS | HEIGHT: 61 IN | HEART RATE: 88 BPM | BODY MASS INDEX: 33.79 KG/M2 | DIASTOLIC BLOOD PRESSURE: 67 MMHG | OXYGEN SATURATION: 97 % | SYSTOLIC BLOOD PRESSURE: 123 MMHG | RESPIRATION RATE: 18 BRPM | TEMPERATURE: 97.8 F

## 2019-12-30 PROBLEM — E66.9 OBESITY: Status: ACTIVE | Noted: 2019-12-30

## 2019-12-30 PROBLEM — O28.9 ABNORMAL FIRST TRIMESTER SCREEN: Status: ACTIVE | Noted: 2019-12-30

## 2019-12-30 LAB
DIRECT EXAM: ABNORMAL
Lab: ABNORMAL
SPECIMEN DESCRIPTION: ABNORMAL

## 2019-12-30 PROCEDURE — 87804 INFLUENZA ASSAY W/OPTIC: CPT

## 2019-12-30 PROCEDURE — 6370000000 HC RX 637 (ALT 250 FOR IP): Performed by: STUDENT IN AN ORGANIZED HEALTH CARE EDUCATION/TRAINING PROGRAM

## 2019-12-30 PROCEDURE — 99284 EMERGENCY DEPT VISIT MOD MDM: CPT

## 2019-12-30 RX ORDER — ECHINACEA PURPUREA EXTRACT 125 MG
1 TABLET ORAL PRN
Qty: 1 BOTTLE | Refills: 3 | Status: SHIPPED | OUTPATIENT
Start: 2019-12-30 | End: 2020-02-13 | Stop reason: CLARIF

## 2019-12-30 RX ORDER — OSELTAMIVIR PHOSPHATE 75 MG/1
75 CAPSULE ORAL 2 TIMES DAILY
Qty: 10 CAPSULE | Refills: 0 | Status: SHIPPED | OUTPATIENT
Start: 2019-12-30 | End: 2020-01-04

## 2019-12-30 RX ORDER — ONDANSETRON 4 MG/1
4 TABLET, ORALLY DISINTEGRATING ORAL EVERY 8 HOURS PRN
Qty: 30 TABLET | Refills: 0 | Status: SHIPPED | OUTPATIENT
Start: 2019-12-30 | End: 2021-11-11

## 2019-12-30 RX ORDER — GUAIFENESIN 600 MG/1
600 TABLET, EXTENDED RELEASE ORAL 2 TIMES DAILY
Qty: 20 TABLET | Refills: 0 | Status: SHIPPED | OUTPATIENT
Start: 2019-12-30 | End: 2020-01-09

## 2019-12-30 RX ORDER — OSELTAMIVIR PHOSPHATE 6 MG/ML
75 FOR SUSPENSION ORAL ONCE
Status: COMPLETED | OUTPATIENT
Start: 2019-12-30 | End: 2019-12-30

## 2019-12-30 RX ORDER — BENZONATATE 100 MG/1
100 CAPSULE ORAL 2 TIMES DAILY PRN
Qty: 20 CAPSULE | Refills: 0 | Status: SHIPPED | OUTPATIENT
Start: 2019-12-30 | End: 2020-01-06

## 2019-12-30 RX ADMIN — OSELTAMIVIR PHOSPHATE 75 MG: 6 POWDER, FOR SUSPENSION ORAL at 05:44

## 2019-12-30 ASSESSMENT — ENCOUNTER SYMPTOMS
SHORTNESS OF BREATH: 0
VOMITING: 1
CONSTIPATION: 0
WHEEZING: 0
BACK PAIN: 0
COUGH: 1
DIARRHEA: 0
RHINORRHEA: 1
ABDOMINAL PAIN: 0
NAUSEA: 1
COLOR CHANGE: 0
CHEST TIGHTNESS: 0

## 2019-12-30 ASSESSMENT — PAIN SCALES - GENERAL: PAINLEVEL_OUTOF10: 6

## 2019-12-30 ASSESSMENT — PAIN DESCRIPTION - FREQUENCY: FREQUENCY: CONTINUOUS

## 2019-12-30 ASSESSMENT — PAIN DESCRIPTION - LOCATION: LOCATION: THROAT;HEAD

## 2019-12-30 NOTE — CONSULTS
Live Births   0 0 0 0 0 1      # Outcome Date GA Lbr Francisco J/2nd Weight Sex Delivery Anes PTL Lv   2 Current            1 Term 05/2012 40w0d  6 lb 5 oz (2.863 kg) F    JACKSON       PAST MEDICAL HISTORY:   has a past medical history of Depression and PTSD (post-traumatic stress disorder). PAST SURGICAL HISTORY:   has a past surgical history that includes Splenectomy (2014) and pelvic laparoscopy (2014). ALLERGIES:  Allergies as of 12/30/2019 - Review Complete 12/30/2019   Allergen Reaction Noted    Sulfamethoxazole-trimethoprim Hives 09/01/2017       MEDICATIONS:  No current facility-administered medications for this encounter. Current Outpatient Medications   Medication Sig Dispense Refill    sodium chloride (OCEAN) 0.65 % nasal spray 1 spray by Nasal route as needed for Congestion 1 Bottle 3    benzonatate (TESSALON) 100 MG capsule Take 1 capsule by mouth 2 times daily as needed for Cough 20 capsule 0    ondansetron (ZOFRAN ODT) 4 MG disintegrating tablet Place 1 tablet under the tongue every 8 hours as needed for Nausea or Vomiting 30 tablet 0    guaiFENesin (MUCINEX) 600 MG extended release tablet Take 1 tablet by mouth 2 times daily for 10 days 20 tablet 0    oseltamivir (TAMIFLU) 75 MG capsule Take 1 capsule by mouth 2 times daily for 5 days 10 capsule 0    Prenatal Vit-Iron Carbonyl-FA (PRENATABS RX) 29-1 MG TABS Take 1 tablet by mouth daily 30 tablet 12    metroNIDAZOLE (METROGEL VAGINAL) 0.75 % vaginal gel One applicator intravaginally every night for 5 days 1 Tube 0    acetaminophen (TYLENOL) 500 MG tablet Take 2 tablets by mouth every 6 hours as needed for Pain 30 tablet 0       FAMILY HISTORY:  family history is not on file. SOCIAL HISTORY:   reports that she has quit smoking. She has never used smokeless tobacco. She reports previous alcohol use. She reports that she does not use drugs.     ______________________________________________________________________ VITALS:  Vitals:    12/30/19 0404   BP: 123/67   Pulse: 88   Resp: 18   Temp: 97.8 °F (36.6 °C)   TempSrc: Oral   SpO2: 97%   Weight: 179 lb (81.2 kg)   Height: 5' 1\" (1.549 m)                                               INPUT/OUTPUT:  No intake/output data recorded. No intake/output data recorded. PHYSICAL EXAM:     General Appearance: Resting comfortably. Alert; in no acute distress. Pleasant. Skin: Skin color, texture, turgor normal. No rashes or lesions. Lymphatic: No abnormally enlarged lymph nodes. Neck and EENT: normal atraumatic, no neck masses, normal thyroid, no jvd  Respiratory: Normal expansion. Clear to auscultation. No rales, rhonchi, or wheezing. Coughing with deep inspiration. Cardiovascular: Regular rate and rhythm, no murmurs, rubs, or gallops  Abdomen: soft, non-tender, non-distended, no right upper quadrant tenderness and no CVA tenderness  Pelvic Exam: not indicated  Rectal Exam: not indicated  Musculoskeletal: no gross abnormalities  Extremities: non-tender BLE and non-edematous  Psych:  oriented to time, place and person, mood and affect are within normal limits     LAB RESULTS:  Recent Results (from the past 12 hour(s))   RAPID INFLUENZA A/B ANTIGENS    Collection Time: 12/30/19  4:30 AM   Result Value Ref Range    Specimen Description . NASOPHARYNGEAL SWAB     Special Requests NOT REPORTED     Direct Exam POSITIVE for Influenza B Antigen (A)     Direct Exam NEGATIVE for Influenza A Antigen     Direct Exam       Results reported to the appropriate Health Department    Direct Exam NETO YEH NOTIFIED  [facility]     Lab Results   Component Value Date    HCGQUANT 26,988 (H) 09/11/2019       Lab Results   Component Value Date    WBC 15.5 (H) 09/11/2019    HGB 13.5 09/11/2019    HCT 40.6 09/11/2019    MCV 88.5 09/11/2019     09/11/2019

## 2019-12-30 NOTE — ED PROVIDER NOTES
[facility]     Emergency Department     Faculty Attestation    I performed a history and physical examination of the patient and discussed management with the resident. I have reviewed and agree with the residents findings including all diagnostic interpretations, and treatment plans as written. Any areas of disagreement are noted on the chart. I was personally present for the key portions of any procedures. I have documented in the chart those procedures where I was not present during the key portions. I have reviewed the emergency nurses triage note. I agree with the chief complaint, past medical history, past surgical history, allergies, medications, social and family history as documented unless otherwise noted below. Documentation of the HPI, Physical Exam and Medical Decision Making performed by scribdaniel is based on my personal performance of the HPI, PE and MDM. For Physician Assistant/ Nurse Practitioner cases/documentation I have personally evaluated this patient and have completed at least one if not all key elements of the E/M (history, physical exam, and MDM). Additional findings are as noted. 27 weeks pregnant, feeling baby move, no cramping no vaginal bleeding or loss of fluid. Patient with cough, congestion, runny nose, fevers.     Tested +flu, patient to get tamiflu  Follows with Josesito Sims D.O, M.P.H  Attending Emergency Medicine Physician         Oz Lau, DO  12/30/19 3882

## 2019-12-30 NOTE — ED PROVIDER NOTES
[facility] ED  Emergency Department Encounter  Emergency Medicine Resident     Pt Name: Mary Jane Waite  MRN: [id number]  [place] 1995  Date of evaluation: 12/30/19  PCP:  Rupali Walls MD    [address] [place] [address]       Chief Complaint   Patient presents with    Cough    Pharyngitis       HISTORY OFPRESENT ILLNESS  (Location/Symptom, Timing/Onset, Context/Setting, Quality, Duration, Modifying Jeani Fester.)      Mary Jane Waite is a 21 y.o. female who presents with cough, congestion, rhinorrhea, difficulty breathing out of her nose. Patient is 27 weeks pregnant. Normal pregnancy so far. Patient is also had emesis, however is unable to discern if this is secondary to her pregnancy as she has had emesis throughout her pregnancy. Patient states she is drinking fluids, however has difficulty tolerating food. PAST MEDICAL / SURGICAL / SOCIAL / FAMILY HISTORY      has a past medical history of Depression and PTSD (post-traumatic stress disorder). has a past surgical history that includes Splenectomy (2014) and pelvic laparoscopy (2014).      Social History     Socioeconomic History    Marital status: Single     Spouse name: Not on file    Number of children: Not on file    Years of education: Not on file    Highest education level: Not on file   Occupational History    Not on file   Social Needs    Financial resource strain: Not on file    Food insecurity:     Worry: Not on file     Inability: Not on file    Transportation needs:     Medical: Not on file     Non-medical: Not on file   Tobacco Use    Smoking status: Former Smoker    Smokeless tobacco: Never Used   Substance and Sexual Activity    Alcohol use: Not Currently    Drug use: Never    Sexual activity: Yes     Partners: Male   Lifestyle    Physical activity:     Days per week: Not on file     Minutes per session: Not on file    Stress: Not on file   Relationships    Social connections:     Talks on phone: Not on file     Gets together: Not on file     Attends [employer] service: Not on file     Active member of club or organization: Not on file     Attends meetings of clubs or organizations: Not on file     Relationship status: Not on file    Intimate partner violence:     Fear of current or ex partner: Not on file     Emotionally abused: Not on file     Physically abused: Not on file     Forced sexual activity: Not on file   Other Topics Concern    Not on file   Social History Narrative    Not on file       History reviewed. No pertinent family history. Allergies:  Sulfamethoxazole-trimethoprim    Home Medications:  Prior to Admission medications    Medication Sig Start Date End Date Taking?  Authorizing Provider   sodium chloride (OCEAN) 0.65 % nasal spray 1 spray by Nasal route as needed for Congestion 12/30/19  Yes Sergio Yo DO   benzonatate (TESSALON) 100 MG capsule Take 1 capsule by mouth 2 times daily as needed for Cough 12/30/19 1/6/20 Yes Sergio Yo DO   ondansetron (ZOFRAN ODT) 4 MG disintegrating tablet Place 1 tablet under the tongue every 8 hours as needed for Nausea or Vomiting 12/30/19  Yes Sergio Yo DO   guaiFENesin (MUCINEX) 600 MG extended release tablet Take 1 tablet by mouth 2 times daily for 10 days 12/30/19 1/9/20 Yes Sergio Yo DO   oseltamivir (TAMIFLU) 75 MG capsule Take 1 capsule by mouth 2 times daily for 5 days 12/30/19 1/4/20 Yes Clemencia Warner DO   Prenatal Vit-Iron Carbonyl-FA (PRENATABS RX) 29-1 MG TABS Take 1 tablet by mouth daily 12/24/19   TORY Gibbs CNM   metroNIDAZOLE (METROGEL VAGINAL) 0.75 % vaginal gel One applicator intravaginally every night for 5 days 12/24/19 12/31/19  TORY Gibbs CNM   acetaminophen (TYLENOL) 500 MG tablet Take 2 tablets by mouth every 6 hours as needed for Pain 10/11/19   Elvis Jolly, DO       REVIEW OFSYSTEMS    (2-9 systems for level 4, 10 or more for level 5)      Review of Systems Abdominal:      General: Bowel sounds are normal. There is no distension. Palpations: Abdomen is soft. Tenderness: There is no tenderness. There is no guarding. Skin:     General: Skin is warm and dry. Capillary Refill: Capillary refill takes less than 2 seconds. Coloration: Skin is not pale. Findings: No erythema. Neurological:      General: No focal deficit present. Mental Status: She is alert and oriented to person, place, and time. Mental status is at baseline. Cranial Nerves: No cranial nerve deficit. Sensory: No sensory deficit. Psychiatric:         Mood and Affect: Mood normal.         Behavior: Behavior normal.         DIFFERENTIAL  DIAGNOSIS     PLAN (LABS / IMAGING / EKG):  Orders Placed This Encounter   Procedures    RAPID INFLUENZA A/B ANTIGENS    Inpatient consult to Obstetrics / Gynecology       MEDICATIONS ORDERED:  Orders Placed This Encounter   Medications    oseltamivir 6mg/ml (TAMIFLU) suspension 75 mg    sodium chloride (OCEAN) 0.65 % nasal spray     Si spray by Nasal route as needed for Congestion     Dispense:  1 Bottle     Refill:  3    benzonatate (TESSALON) 100 MG capsule     Sig: Take 1 capsule by mouth 2 times daily as needed for Cough     Dispense:  20 capsule     Refill:  0    ondansetron (ZOFRAN ODT) 4 MG disintegrating tablet     Sig: Place 1 tablet under the tongue every 8 hours as needed for Nausea or Vomiting     Dispense:  30 tablet     Refill:  0    guaiFENesin (MUCINEX) 600 MG extended release tablet     Sig: Take 1 tablet by mouth 2 times daily for 10 days     Dispense:  20 tablet     Refill:  0    oseltamivir (TAMIFLU) 75 MG capsule     Sig: Take 1 capsule by mouth 2 times daily for 5 days     Dispense:  10 capsule     Refill:  0       DDX: Viral URI, influenza    Initial MDM/Plan: 21 y.o. female who presents with cough, congestion, rhinorrhea, and emesis.   Patient is 27 weeks pregnant and is unknown if the emesis is related to the pregnancy or to her current illness. Patient overall no acute distress. No emesis since arrival.  Physical exam largely unremarkable. Lungs clear to auscultation bilaterally. Oral mucosa mildly dry. Will discuss patient with OB/GYN service. Rapid flu pending. DIAGNOSTIC RESULTS / EMERGENCYDEPARTMENT COURSE / MDM     LABS:  Labs Reviewed   RAPID INFLUENZA A/B ANTIGENS - Abnormal; Notable for the following components:       Result Value    Direct Exam POSITIVE for Influenza B Antigen (*)     All other components within normal limits         RADIOLOGY:  No results found. EMERGENCY DEPARTMENT COURSE:  ED Course as of Dec 30 0743   Mon Dec 30, 2019   5983 Bedside ultrasound performed with fetal heart rate of 142. [JG]      ED Course User Index  [JG] Cliff Dasilva,    Rapid flu positive. Discussed patient with OB/GYN, who came and evaluated patient. They are comfortable with discharge with appropriate follow-up. Patient to be given scripts for symptomatic management by them, and Tamiflu from the emergency department service. Patient comfortable discharge at this time. PROCEDURES:  None    CONSULTS:  IP CONSULT TO OB GYN    CRITICAL CARE:  Please see attending note    FINAL IMPRESSION      1.  Influenza with respiratory manifestation other than pneumonia          DISPOSITION / PLAN     DISPOSITION Decision To Discharge 12/30/2019 05:42:07 AM      PATIENT REFERRED TO:  Sp Waldron, [address] [place] [address] 90 100 [facility]    Schedule an appointment as soon as possible for a visit in 1 week  Hospital follow up    [facility] ED  [address] [facility] [id number] [phone]  Go to   If symptoms worsen      DISCHARGE MEDICATIONS:  Discharge Medication List as of 12/30/2019  5:43 AM      START taking these medications    Details   sodium chloride (OCEAN) 0.65 % nasal spray 1 spray by Nasal route as needed for Congestion,

## 2020-01-08 ENCOUNTER — ROUTINE PRENATAL (OUTPATIENT)
Dept: OBGYN CLINIC | Age: 25
End: 2020-01-08
Payer: COMMERCIAL

## 2020-01-08 VITALS
SYSTOLIC BLOOD PRESSURE: 102 MMHG | WEIGHT: 177 LBS | HEART RATE: 83 BPM | BODY MASS INDEX: 33.44 KG/M2 | DIASTOLIC BLOOD PRESSURE: 60 MMHG

## 2020-01-08 PROCEDURE — G8427 DOCREV CUR MEDS BY ELIG CLIN: HCPCS | Performed by: NURSE PRACTITIONER

## 2020-01-08 PROCEDURE — G8482 FLU IMMUNIZE ORDER/ADMIN: HCPCS | Performed by: NURSE PRACTITIONER

## 2020-01-08 PROCEDURE — 99213 OFFICE O/P EST LOW 20 MIN: CPT | Performed by: NURSE PRACTITIONER

## 2020-01-08 PROCEDURE — 1036F TOBACCO NON-USER: CPT | Performed by: NURSE PRACTITIONER

## 2020-01-08 PROCEDURE — G8417 CALC BMI ABV UP PARAM F/U: HCPCS | Performed by: NURSE PRACTITIONER
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
## 2020-01-11 ENCOUNTER — APPOINTMENT (OUTPATIENT)
Dept: CT IMAGING | Age: 25
End: 2020-01-11
Payer: COMMERCIAL

## 2020-01-11 ENCOUNTER — HOSPITAL ENCOUNTER (EMERGENCY)
Age: 25
Discharge: HOME | End: 2020-01-11
Attending: EMERGENCY MEDICINE
Payer: COMMERCIAL

## 2020-01-11 VITALS
HEIGHT: 61 IN | SYSTOLIC BLOOD PRESSURE: 124 MMHG | HEART RATE: 78 BPM | TEMPERATURE: 97.9 F | DIASTOLIC BLOOD PRESSURE: 70 MMHG | BODY MASS INDEX: 33.61 KG/M2 | RESPIRATION RATE: 12 BRPM | OXYGEN SATURATION: 96 % | WEIGHT: 178 LBS

## 2020-01-11 LAB
-: ABNORMAL
ABSOLUTE EOS #: 0.18 K/UL (ref 0–0.4)
ABSOLUTE IMMATURE GRANULOCYTE: ABNORMAL K/UL (ref 0–0.3)
ABSOLUTE LYMPH #: 4 K/UL (ref 1–4.8)
ABSOLUTE MONO #: 1.82 K/UL (ref 0.1–0.8)
ALBUMIN SERPL-MCNC: 3.3 G/DL (ref 3.5–5.2)
ALBUMIN/GLOBULIN RATIO: 0.9 (ref 1–2.5)
ALP BLD-CCNC: 128 U/L (ref 35–104)
ALT SERPL-CCNC: 8 U/L (ref 5–33)
AMORPHOUS: ABNORMAL
ANION GAP SERPL CALCULATED.3IONS-SCNC: 12 MMOL/L (ref 9–17)
AST SERPL-CCNC: 16 U/L
BACTERIA: ABNORMAL
BASOPHILS # BLD: 0 % (ref 0–2)
BASOPHILS ABSOLUTE: 0 K/UL (ref 0–0.2)
BILIRUB SERPL-MCNC: 0.13 MG/DL (ref 0.3–1.2)
BILIRUBIN URINE: NEGATIVE
BUN BLDV-MCNC: 14 MG/DL (ref 6–20)
BUN/CREAT BLD: ABNORMAL (ref 9–20)
CALCIUM SERPL-MCNC: 8.6 MG/DL (ref 8.6–10.4)
CASTS UA: ABNORMAL /LPF
CHLORIDE BLD-SCNC: 100 MMOL/L (ref 98–107)
CO2: 21 MMOL/L (ref 20–31)
COLOR: YELLOW
COMMENT UA: ABNORMAL
CREAT SERPL-MCNC: 0.41 MG/DL (ref 0.5–0.9)
CRYSTALS, UA: ABNORMAL /HPF
D-DIMER QUANTITATIVE: 1.72 MG/L FEU
DIFFERENTIAL TYPE: ABNORMAL
EKG ATRIAL RATE: 67 BPM
EKG P AXIS: 28 DEGREES
EKG P-R INTERVAL: 140 MS
EKG Q-T INTERVAL: 396 MS
EKG QRS DURATION: 82 MS
EKG QTC CALCULATION (BAZETT): 418 MS
EKG R AXIS: 80 DEGREES
EKG T AXIS: 38 DEGREES
EKG VENTRICULAR RATE: 67 BPM
EOSINOPHILS RELATIVE PERCENT: 1 % (ref 1–4)
EPITHELIAL CELLS UA: ABNORMAL /HPF (ref 0–5)
GFR AFRICAN AMERICAN: >60 ML/MIN
GFR NON-AFRICAN AMERICAN: >60 ML/MIN
GFR SERPL CREATININE-BSD FRML MDRD: ABNORMAL ML/MIN/{1.73_M2}
GFR SERPL CREATININE-BSD FRML MDRD: ABNORMAL ML/MIN/{1.73_M2}
GLUCOSE BLD-MCNC: 94 MG/DL (ref 70–99)
GLUCOSE URINE: NEGATIVE
HCT VFR BLD CALC: 32.6 % (ref 36–46)
HEMOGLOBIN: 10.5 G/DL (ref 12–16)
IMMATURE GRANULOCYTES: ABNORMAL %
KETONES, URINE: ABNORMAL
LEUKOCYTE ESTERASE, URINE: NEGATIVE
LIPASE: 22 U/L (ref 13–60)
LYMPHOCYTES # BLD: 22 % (ref 24–44)
MAGNESIUM: 1.8 MG/DL (ref 1.6–2.6)
MCH RBC QN AUTO: 28.6 PG (ref 26–34)
MCHC RBC AUTO-ENTMCNC: 32.2 G/DL (ref 31–37)
MCV RBC AUTO: 88.8 FL (ref 80–100)
METAMYELOCYTES ABSOLUTE COUNT: 0.18 K/UL
METAMYELOCYTES: 1 %
MONOCYTES # BLD: 10 % (ref 1–7)
MORPHOLOGY: NORMAL
MUCUS: ABNORMAL
MYELOCYTES ABSOLUTE COUNT: 0.18 K/UL
MYELOCYTES: 1 %
NITRITE, URINE: NEGATIVE
NRBC AUTOMATED: ABNORMAL PER 100 WBC
OTHER OBSERVATIONS UA: ABNORMAL
PDW BLD-RTO: 14.3 % (ref 12.5–15.4)
PH UA: 6 (ref 5–8)
PLATELET # BLD: 307 K/UL (ref 140–450)
PLATELET ESTIMATE: ABNORMAL
PMV BLD AUTO: 13.2 FL (ref 8–14)
POTASSIUM SERPL-SCNC: 3.9 MMOL/L (ref 3.7–5.3)
PROTEIN UA: NEGATIVE
RBC # BLD: 3.67 M/UL (ref 4–5.2)
RBC # BLD: ABNORMAL 10*6/UL
RBC UA: ABNORMAL /HPF (ref 0–2)
RENAL EPITHELIAL, UA: ABNORMAL /HPF
SEG NEUTROPHILS: 65 % (ref 36–66)
SEGMENTED NEUTROPHILS ABSOLUTE COUNT: 11.84 K/UL (ref 1.8–7.7)
SODIUM BLD-SCNC: 133 MMOL/L (ref 135–144)
SPECIFIC GRAVITY UA: 1.02 (ref 1–1.03)
TOTAL PROTEIN: 7.1 G/DL (ref 6.4–8.3)
TRICHOMONAS: ABNORMAL
TROPONIN INTERP: NORMAL
TROPONIN T: NORMAL NG/ML
TROPONIN, HIGH SENSITIVITY: <6 NG/L (ref 0–14)
TURBIDITY: CLEAR
URINE HGB: NEGATIVE
UROBILINOGEN, URINE: NORMAL
WBC # BLD: 18.2 K/UL (ref 3.5–11)
WBC # BLD: ABNORMAL 10*3/UL
WBC UA: ABNORMAL /HPF (ref 0–5)
YEAST: ABNORMAL

## 2020-01-11 PROCEDURE — 84484 ASSAY OF TROPONIN QUANT: CPT

## 2020-01-11 PROCEDURE — 2580000003 HC RX 258: Performed by: EMERGENCY MEDICINE

## 2020-01-11 PROCEDURE — 93005 ELECTROCARDIOGRAM TRACING: CPT

## 2020-01-11 PROCEDURE — 85025 COMPLETE CBC W/AUTO DIFF WBC: CPT

## 2020-01-11 PROCEDURE — 85379 FIBRIN DEGRADATION QUANT: CPT

## 2020-01-11 PROCEDURE — 83735 ASSAY OF MAGNESIUM: CPT

## 2020-01-11 PROCEDURE — 6360000004 HC RX CONTRAST MEDICATION: Performed by: EMERGENCY MEDICINE

## 2020-01-11 PROCEDURE — 87086 URINE CULTURE/COLONY COUNT: CPT

## 2020-01-11 PROCEDURE — 99284 EMERGENCY DEPT VISIT MOD MDM: CPT

## 2020-01-11 PROCEDURE — 80053 COMPREHEN METABOLIC PANEL: CPT

## 2020-01-11 PROCEDURE — 71260 CT THORAX DX C+: CPT

## 2020-01-11 PROCEDURE — 81001 URINALYSIS AUTO W/SCOPE: CPT

## 2020-01-11 PROCEDURE — 83690 ASSAY OF LIPASE: CPT

## 2020-01-11 PROCEDURE — 36415 COLL VENOUS BLD VENIPUNCTURE: CPT

## 2020-01-11 RX ORDER — 0.9 % SODIUM CHLORIDE 0.9 %
1000 INTRAVENOUS SOLUTION INTRAVENOUS ONCE
Status: COMPLETED | OUTPATIENT
Start: 2020-01-11 | End: 2020-01-11

## 2020-01-11 RX ORDER — 0.9 % SODIUM CHLORIDE 0.9 %
80 INTRAVENOUS SOLUTION INTRAVENOUS ONCE
Status: COMPLETED | OUTPATIENT
Start: 2020-01-11 | End: 2020-01-11

## 2020-01-11 RX ORDER — SODIUM CHLORIDE 0.9 % (FLUSH) 0.9 %
10 SYRINGE (ML) INJECTION PRN
Status: DISCONTINUED | OUTPATIENT
Start: 2020-01-11 | End: 2020-01-11 | Stop reason: HOSPADM

## 2020-01-11 RX ADMIN — IOVERSOL 75 ML: 741 INJECTION INTRA-ARTERIAL; INTRAVENOUS at 07:25

## 2020-01-11 RX ADMIN — SODIUM CHLORIDE 80 ML: 9 INJECTION, SOLUTION INTRAVENOUS at 07:26

## 2020-01-11 RX ADMIN — SODIUM CHLORIDE 1000 ML: 9 INJECTION, SOLUTION INTRAVENOUS at 05:54

## 2020-01-11 RX ADMIN — Medication 10 ML: at 07:26

## 2020-01-11 ASSESSMENT — ENCOUNTER SYMPTOMS
DIARRHEA: 0
BACK PAIN: 0
COUGH: 0
SORE THROAT: 0
EYE PAIN: 0
VOMITING: 0
RHINORRHEA: 0
SHORTNESS OF BREATH: 0
NAUSEA: 0
ABDOMINAL PAIN: 0

## 2020-01-11 NOTE — ED PROVIDER NOTES
both lungs. Respiratory motion. No lobar airspace consolidation. Parenchymal banding/atelectasis in the medial right middle lobe. Moderate elevation of the right hemidiaphragm. Upper Abdomen: Surgical clips along the left hepatic lobe. Mild fatty infiltration of the liver. Soft Tissues/Bones: No acute osseous abnormality. 1. No clear evidence for pulmonary embolus. 2. Mild dependent atelectasis. Respiratory motion. No lobar airspace consolidation. Parenchymal banding/atelectasis in the medial right middle lobe. 3. Moderate elevation of the right hemidiaphragm. 4. Mild fatty liver. Surgical clips along the left hepatic lobe.      LABS:   Results for orders placed or performed during the hospital encounter of 01/11/20   CBC Auto Differential   Result Value Ref Range    WBC 18.2 (H) 3.5 - 11.0 k/uL    RBC 3.67 (L) 4.0 - 5.2 m/uL    Hemoglobin 10.5 (L) 12.0 - 16.0 g/dL    Hematocrit 32.6 (L) 36 - 46 %    MCV 88.8 80 - 100 fL    MCH 28.6 26 - 34 pg    MCHC 32.2 31 - 37 g/dL    RDW 14.3 12.5 - 15.4 %    Platelets 677 128 - 790 k/uL    MPV 13.2 8.0 - 14.0 fL    NRBC Automated NOT REPORTED per 100 WBC    Differential Type NOT REPORTED     Immature Granulocytes NOT REPORTED 0 %    Absolute Immature Granulocyte NOT REPORTED 0.00 - 0.30 k/uL    WBC Morphology NOT REPORTED     RBC Morphology NOT REPORTED     Platelet Estimate NOT REPORTED     Seg Neutrophils 65 36 - 66 %    Lymphocytes 22 (L) 24 - 44 %    Monocytes 10 (H) 1 - 7 %    Eosinophils % 1 1 - 4 %    Basophils 0 0 - 2 %    Metamyelocytes 1 (H) 0 %    Myelocytes 1 (H) 0 %    Segs Absolute 11.84 (H) 1.8 - 7.7 k/uL    Absolute Lymph # 4.00 1.0 - 4.8 k/uL    Absolute Mono # 1.82 (H) 0.1 - 0.8 k/uL    Absolute Eos # 0.18 0.0 - 0.4 k/uL    Basophils Absolute 0.00 0.0 - 0.2 k/uL    Metamyelocytes Absolute 0.18 (H) 0 k/uL    Myelocytes Absolute 0.18 (H) 0 k/uL    Morphology Normal    Comprehensive Metabolic Panel   Result Value Ref Range    Glucose 94 70 - 99 mg/dL Interp NOT REPORTED    EKG 12 Lead   Result Value Ref Range    Ventricular Rate 67 BPM    Atrial Rate 67 BPM    P-R Interval 140 ms    QRS Duration 82 ms    Q-T Interval 396 ms    QTc Calculation (Bazett) 418 ms    P Axis 28 degrees    R Axis 80 degrees    T Axis 38 degrees       RECENT VITALS:  BP: 118/69, Temp: 97.9 °F (36.6 °C), Pulse: 77, Resp: 18     ED Course     The patient was given the following medications:  Orders Placed This Encounter   Medications    0.9 % sodium chloride bolus    sodium chloride flush 0.9 % injection 10 mL    0.9 % sodium chloride bolus    ioversol (OPTIRAY) 74 % injection 75 mL     During the emergency department course, patient was put on the monitor which revealed normal sinus rhythm. She was given normal saline bolus. She is feeling much better and resting comfortably. Medical Decision Making      44-year-old female patient G2, P1 about 29 weeks pregnant presents to the emergency department complaining of lightheadedness and dizziness. She admits to having shortness of breath upon exertion. She denies any chest pain, abdominal pain, vaginal bleeding or discharge. During the ED stay, patient is hemodynamically stable and neurologically intact. She has been resting comfortably. Rectal examination was performed which is Hemoccult negative. Plan is to discharge the patient with instructions drink plenty of oral fluids, continue current medications, follow-up with her PCP and her obstetrician, call their offices on Monday morning for appointment, return if worse. Disposition     FINAL IMPRESSION      1. Dizziness    2. Lightheadedness    3.  Anemia, unspecified type          DISPOSITION/PLAN   DISPOSITION Decision To Discharge 01/11/2020 08:31:17 AM      PATIENT REFERRED TO:  Abdulkadir Camarillo MD  [address] [place] [address],[address] [address]    Call in 2 days  For reevaluation of current symptoms    [facility], [address] [address], [phone]    Call in 2 days  For reevaluation of current symptoms    [facility] ED  [address] [address]  [phone]    If symptoms worsen      DISCHARGE MEDICATIONS:  New Prescriptions    No medications on file             (Please note that portions of this note were completed with a voice recognition program.  Efforts were made to edit the dictations but occasionally words are mis-transcribed.)    Parisi MD, F.A.C.E.P.   Attending Emergency Medicine Physician               Lola Nino MD  01/11/20 0344

## 2020-01-11 NOTE — ED PROVIDER NOTES
[address] [facility] ED  [address] [facility] [address]  Phone: [phone]  Fax: [phone] [facility] [place]          Pt Name: Beverley Oglesby  MRN: [id number]  [place] 1995  Date of evaluation: 1/11/2020      CHIEF COMPLAINT       Chief Complaint   Patient presents with    Dizziness     pt reports that she got dizzy and felt like she was going to pass out at work (pt \"just got over Influenza B)  Pt is 29 weeks pregnant       HISTORY OF PRESENT Mika Alvarez is a 21 y.o. female who presents with dizziness/near syncope. She reports that about a week ago is when the symptoms began but seem worse tonight while at work. She is on her feet much of the day as a  at [employer] and just came off work. States the symptoms are worse while she is working typically. Patient is approximately 34 weeks gestational age. G2, P1. No issues with her first pregnancy. Patient reports numerous similar symptoms throughout this pregnancy with the dizziness/nausea/lightheadedness. Denies chest pain or difficulty breathing. Patient does report fairly normal eating/oral intake. She denies any vaginal bleeding or discharge. Denies rhythmic abdominal discomfort but does report some mild tightening on occasion of her abdominal muscles. Not currently present. Seems to occur worse when she is exerting herself. She states she recently had influenza B and is getting over those symptoms. She states she found out she was pregnant about 11 weeks ago. Has had an ultrasound that showed no significant problems at this time. She also reports she has not yet had her Glucola test for diabetes. Patient denies any headaches or neurologic symptoms otherwise. Denies any dysuria or urinary symptoms. Her obstetrician is Dr. Dayne Garcia       Review of Systems   Constitutional: Negative for chills, fatigue and fever.    HENT: Negative for congestion, rhinorrhea and sore throat. Eyes: Negative for pain. Respiratory: Negative for cough and shortness of breath. Cardiovascular: Negative for chest pain. Gastrointestinal: Negative for abdominal pain, diarrhea, nausea and vomiting. Genitourinary: Negative for difficulty urinating, dysuria, frequency, hematuria, vaginal bleeding and vaginal discharge. Musculoskeletal: Negative for back pain and neck pain. Skin: Negative for rash. Neurological: Positive for dizziness and light-headedness. Negative for weakness and headaches. PAST MEDICAL HISTORY    has a past medical history of Depression and PTSD (post-traumatic stress disorder). SURGICAL HISTORY      has a past surgical history that includes Splenectomy (2014) and pelvic laparoscopy (2014). CURRENT MEDICATIONS       Discharge Medication List as of 1/11/2020  8:32 AM      CONTINUE these medications which have NOT CHANGED    Details   sodium chloride (OCEAN) 0.65 % nasal spray 1 spray by Nasal route as needed for Congestion, Disp-1 Bottle, R-3Print      ondansetron (ZOFRAN ODT) 4 MG disintegrating tablet Place 1 tablet under the tongue every 8 hours as needed for Nausea or Vomiting, Disp-30 tablet, R-0Print      Prenatal Vit-Iron Carbonyl-FA (PRENATABS RX) 29-1 MG TABS Take 1 tablet by mouth daily, Disp-30 tablet, R-12Normal             ALLERGIES     is allergic to sulfamethoxazole-trimethoprim. FAMILY HISTORY     has no family status information on file. family history is not on file. SOCIAL HISTORY      reports that she has quit smoking. She has never used smokeless tobacco. She reports previous alcohol use. She reports that she does not use drugs. PHYSICAL EXAM     INITIAL VITALS:  height is 5' 1\" (1.549 m) and weight is 178 lb (80.7 kg). Her oral temperature is 97.9 °F (36.6 °C). Her blood pressure is 124/70 and her pulse is 78. Her respiration is 12 and oxygen saturation is 96%.       Physical Exam Constitutional: She appears well-developed and well-nourished. Non-toxic appearance. She does not appear ill. No distress. HENT:   Head: Normocephalic and atraumatic. Right Ear: External ear normal.   Left Ear: External ear normal.   Eyes: EOM and lids are normal.   Neck: No tracheal deviation present. Cardiovascular: Normal rate and regular rhythm. Pulmonary/Chest: Effort normal and breath sounds normal. No respiratory distress. Abdominal: Soft. There is no tenderness. Benign abdominal exam.  No tenderness. Neurological: She is alert. GCS eye subscore is 4. GCS verbal subscore is 5. GCS motor subscore is 6. Skin: Skin is warm and dry. Psychiatric: She has a normal mood and affect. Her speech is normal.   Vitals reviewed. DIFFERENTIAL DIAGNOSIS/ MDM:     Plan at this time will be to check baseline labs and hydrate with fluids. May be dehydrated. Clinically she appears well and otherwise nontoxic. Vital signs are normal.    DIAGNOSTIC RESULTS     EKG: All EKG's are interpreted by the Emergency Department Physician who either signs or Co-signs this chart in the absence of a cardiologist.    Interpreted by No att. providers found     Rhythm: Sinus arrhythmia  Rate: normal  Axis: normal  Ectopy: none  Conduction: normal  ST Segments: no acute change  T Waves: no acute change    Clinical Impression:  Sinus arrhythmia rhythm with no acute changes/normal EKG. No acute infarction/ischemia noted. RADIOLOGY:   I directly visualized the following  images and reviewed the radiologist interpretations:  CT Chest Pulmonary Embolism W Contrast   Final Result   1. No clear evidence for pulmonary embolus. 2. Mild dependent atelectasis. Respiratory motion. No lobar airspace   consolidation. Parenchymal banding/atelectasis in the medial right middle   lobe. 3. Moderate elevation of the right hemidiaphragm. 4. Mild fatty liver. Surgical clips along the left hepatic lobe.              No results found.     LABS:  Results for orders placed or performed during the hospital encounter of 01/11/20   CBC Auto Differential   Result Value Ref Range    WBC 18.2 (H) 3.5 - 11.0 k/uL    RBC 3.67 (L) 4.0 - 5.2 m/uL    Hemoglobin 10.5 (L) 12.0 - 16.0 g/dL    Hematocrit 32.6 (L) 36 - 46 %    MCV 88.8 80 - 100 fL    MCH 28.6 26 - 34 pg    MCHC 32.2 31 - 37 g/dL    RDW 14.3 12.5 - 15.4 %    Platelets 175 248 - 699 k/uL    MPV 13.2 8.0 - 14.0 fL    NRBC Automated NOT REPORTED per 100 WBC    Differential Type NOT REPORTED     Immature Granulocytes NOT REPORTED 0 %    Absolute Immature Granulocyte NOT REPORTED 0.00 - 0.30 k/uL    WBC Morphology NOT REPORTED     RBC Morphology NOT REPORTED     Platelet Estimate NOT REPORTED     Seg Neutrophils 65 36 - 66 %    Lymphocytes 22 (L) 24 - 44 %    Monocytes 10 (H) 1 - 7 %    Eosinophils % 1 1 - 4 %    Basophils 0 0 - 2 %    Metamyelocytes 1 (H) 0 %    Myelocytes 1 (H) 0 %    Segs Absolute 11.84 (H) 1.8 - 7.7 k/uL    Absolute Lymph # 4.00 1.0 - 4.8 k/uL    Absolute Mono # 1.82 (H) 0.1 - 0.8 k/uL    Absolute Eos # 0.18 0.0 - 0.4 k/uL    Basophils Absolute 0.00 0.0 - 0.2 k/uL    Metamyelocytes Absolute 0.18 (H) 0 k/uL    Myelocytes Absolute 0.18 (H) 0 k/uL    Morphology Normal    Comprehensive Metabolic Panel   Result Value Ref Range    Glucose 94 70 - 99 mg/dL    BUN 14 6 - 20 mg/dL    CREATININE 0.41 (L) 0.50 - 0.90 mg/dL    Bun/Cre Ratio NOT REPORTED 9 - 20    Calcium 8.6 8.6 - 10.4 mg/dL    Sodium 133 (L) 135 - 144 mmol/L    Potassium 3.9 3.7 - 5.3 mmol/L    Chloride 100 98 - 107 mmol/L    CO2 21 20 - 31 mmol/L    Anion Gap 12 9 - 17 mmol/L    Alkaline Phosphatase 128 (H) 35 - 104 U/L    ALT 8 5 - 33 U/L    AST 16 <32 U/L    Total Bilirubin 0.13 (L) 0.3 - 1.2 mg/dL    Total Protein 7.1 6.4 - 8.3 g/dL    Alb 3.3 (L) 3.5 - 5.2 g/dL    Albumin/Globulin Ratio 0.9 (L) 1.0 - 2.5    GFR Non-African American >60 >60 mL/min    GFR African American >60 >60 mL/min    GFR Comment BP: 118/69 124/70   Pulse: 77 78   Resp: 18 12   Temp: 97.9 °F (36.6 °C)    TempSrc: Oral    SpO2: 96% 96%   Weight: 178 lb (80.7 kg)    Height: 5' 1\" (1.549 m)      -------------------------  BP: 124/70, Temp: 97.9 °F (36.6 °C), Pulse: 78, Resp: 12      Re-evaluation Notes    Patient signed over to Dr. Danilo De Leon. D-dimer is elevated. CT scan ordered. Discussed with the patient and she is comfortable and agreeable with the plan. Also has leukocytosis however she is getting over influenza and was also 15,000 in September. May be secondary to her pregnancy. CONSULTS:    None    CRITICAL CARE:     None    PROCEDURES:    None    FINAL IMPRESSION      1. Dizziness    2. Lightheadedness    3. Anemia, unspecified type          DISPOSITION/PLAN   DISPOSITION        Condition on Disposition    Improved    PATIENT REFERRED TO:  Gretchen Zhang MD  [address] [address]    Call in 2 days  For reevaluation of current symptoms    Flor Kingstonll, [address] [place] [address] 75, [address]  [address] [facility] [phone]    Call in 2 days  For reevaluation of current symptoms    [facility] ED  [address]   [address] [place] [address]  [phone]    If symptoms worsen      DISCHARGE MEDICATIONS:  Discharge Medication List as of 1/11/2020  8:32 AM          (Please note that portions of this note were completed with a voice recognition program.  Efforts were made to edit the dictations but occasionally words are mis-transcribed.)    Teto Doan DO  Attending Emergency Physician       Teto Doan,   01/11/20 [place] 0794, DO  01/12/20 2359

## 2020-01-11 NOTE — LETTER
[address] [facility] ED  [address] [place] [address]  Phone: [phone]  Fax: [phone]             January 11, 2020    Patient: Spenser Read   YOB: 1995   Date of Visit: 1/11/2020       To Whom It May Concern:    Kelby Moreira was seen and treated in our emergency department on 1/11/2020.  She may return to work on 112/2020      Sincerely,             Signature:__________________________________

## 2020-01-12 LAB
CULTURE: NORMAL
Lab: NORMAL
SPECIMEN DESCRIPTION: NORMAL

## 2020-01-20 ENCOUNTER — ROUTINE PRENATAL (OUTPATIENT)
Dept: OBGYN CLINIC | Age: 25
End: 2020-01-20
Payer: COMMERCIAL

## 2020-01-20 VITALS
BODY MASS INDEX: 33.82 KG/M2 | SYSTOLIC BLOOD PRESSURE: 104 MMHG | HEART RATE: 84 BPM | WEIGHT: 179 LBS | DIASTOLIC BLOOD PRESSURE: 60 MMHG

## 2020-01-20 PROCEDURE — 59425 ANTEPARTUM CARE ONLY: CPT | Performed by: OBSTETRICS & GYNECOLOGY

## 2020-01-20 PROCEDURE — 90715 TDAP VACCINE 7 YRS/> IM: CPT | Performed by: OBSTETRICS & GYNECOLOGY

## 2020-01-20 PROCEDURE — 90471 IMMUNIZATION ADMIN: CPT | Performed by: OBSTETRICS & GYNECOLOGY
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
## 2020-01-22 ENCOUNTER — ROUTINE PRENATAL (OUTPATIENT)
Dept: PERINATAL CARE | Age: 25
End: 2020-01-22
Payer: COMMERCIAL

## 2020-01-22 VITALS
BODY MASS INDEX: 33.99 KG/M2 | RESPIRATION RATE: 16 BRPM | HEART RATE: 72 BPM | DIASTOLIC BLOOD PRESSURE: 57 MMHG | WEIGHT: 180 LBS | TEMPERATURE: 97.7 F | SYSTOLIC BLOOD PRESSURE: 105 MMHG | HEIGHT: 61 IN

## 2020-01-22 PROCEDURE — 76819 FETAL BIOPHYS PROFIL W/O NST: CPT | Performed by: OBSTETRICS & GYNECOLOGY

## 2020-01-22 PROCEDURE — 76820 UMBILICAL ARTERY ECHO: CPT | Performed by: OBSTETRICS & GYNECOLOGY

## 2020-01-22 PROCEDURE — 99999 PR OFFICE/OUTPT VISIT,PROCEDURE ONLY: CPT | Performed by: OBSTETRICS & GYNECOLOGY

## 2020-01-22 PROCEDURE — 76816 OB US FOLLOW-UP PER FETUS: CPT | Performed by: OBSTETRICS & GYNECOLOGY

## 2020-01-29 ENCOUNTER — HOSPITAL ENCOUNTER (OUTPATIENT)
Age: 25
Discharge: HOME | End: 2020-01-29
Payer: COMMERCIAL

## 2020-01-29 PROBLEM — O24.419 GESTATIONAL DIABETES MELLITUS (GDM) AFFECTING PREGNANCY, ANTEPARTUM: Status: ACTIVE | Noted: 2020-01-29

## 2020-01-29 LAB
-: ABNORMAL
ABSOLUTE EOS #: 0.3 K/UL (ref 0–0.4)
ABSOLUTE IMMATURE GRANULOCYTE: ABNORMAL K/UL (ref 0–0.3)
ABSOLUTE LYMPH #: 3.3 K/UL (ref 1–4.8)
ABSOLUTE MONO #: 1.1 K/UL (ref 0.1–1.3)
AMORPHOUS: ABNORMAL
BACTERIA: ABNORMAL
BASOPHILS # BLD: 0 % (ref 0–2)
BASOPHILS ABSOLUTE: 0.1 K/UL (ref 0–0.2)
BILIRUBIN URINE: NEGATIVE
CASTS UA: ABNORMAL /LPF
COLOR: YELLOW
COMMENT UA: ABNORMAL
CRYSTALS, UA: ABNORMAL /HPF
DIFFERENTIAL TYPE: ABNORMAL
EOSINOPHILS RELATIVE PERCENT: 2 % (ref 0–4)
EPITHELIAL CELLS UA: ABNORMAL /HPF
GLUCOSE ADMINISTRATION: ABNORMAL
GLUCOSE TOLERANCE SCREEN 50G: 187 MG/DL (ref 70–135)
GLUCOSE URINE: NEGATIVE
HCT VFR BLD CALC: 33.3 % (ref 36–46)
HEMOGLOBIN: 11.1 G/DL (ref 12–16)
IMMATURE GRANULOCYTES: ABNORMAL %
KETONES, URINE: NEGATIVE
LEUKOCYTE ESTERASE, URINE: ABNORMAL
LYMPHOCYTES # BLD: 21 % (ref 24–44)
MCH RBC QN AUTO: 29 PG (ref 26–34)
MCHC RBC AUTO-ENTMCNC: 33.5 G/DL (ref 31–37)
MCV RBC AUTO: 86.6 FL (ref 80–100)
MONOCYTES # BLD: 7 % (ref 1–7)
MUCUS: ABNORMAL
NITRITE, URINE: NEGATIVE
NRBC AUTOMATED: ABNORMAL PER 100 WBC
OTHER OBSERVATIONS UA: ABNORMAL
PDW BLD-RTO: 13.6 % (ref 11.5–14.9)
PH UA: 6 (ref 5–8)
PLATELET # BLD: 270 K/UL (ref 150–450)
PLATELET ESTIMATE: ABNORMAL
PMV BLD AUTO: 10.5 FL (ref 6–12)
PROTEIN UA: NEGATIVE
RBC # BLD: 3.84 M/UL (ref 4–5.2)
RBC # BLD: ABNORMAL 10*6/UL
RBC UA: ABNORMAL /HPF
RENAL EPITHELIAL, UA: ABNORMAL /HPF
SEG NEUTROPHILS: 70 % (ref 36–66)
SEGMENTED NEUTROPHILS ABSOLUTE COUNT: 11.2 K/UL (ref 1.3–9.1)
SPECIFIC GRAVITY UA: 1.02 (ref 1–1.03)
TRICHOMONAS: ABNORMAL
TURBIDITY: ABNORMAL
URINE HGB: NEGATIVE
UROBILINOGEN, URINE: NORMAL
WBC # BLD: 16 K/UL (ref 3.5–11)
WBC # BLD: ABNORMAL 10*3/UL
WBC UA: ABNORMAL /HPF
YEAST: ABNORMAL

## 2020-01-29 PROCEDURE — 36415 COLL VENOUS BLD VENIPUNCTURE: CPT

## 2020-01-29 PROCEDURE — 81001 URINALYSIS AUTO W/SCOPE: CPT

## 2020-01-29 PROCEDURE — 82950 GLUCOSE TEST: CPT

## 2020-01-29 PROCEDURE — 87086 URINE CULTURE/COLONY COUNT: CPT

## 2020-01-29 PROCEDURE — 85025 COMPLETE CBC W/AUTO DIFF WBC: CPT

## 2020-01-30 ENCOUNTER — TELEPHONE (OUTPATIENT)
Dept: OBGYN CLINIC | Age: 25
End: 2020-01-30

## 2020-01-30 ENCOUNTER — TELEPHONE (OUTPATIENT)
Dept: PERINATAL CARE | Age: 25
End: 2020-01-30

## 2020-01-30 LAB
CULTURE: NORMAL
Lab: NORMAL
SPECIMEN DESCRIPTION: NORMAL

## 2020-02-04 ENCOUNTER — ROUTINE PRENATAL (OUTPATIENT)
Dept: OBGYN CLINIC | Age: 25
End: 2020-02-04
Payer: COMMERCIAL

## 2020-02-04 VITALS
DIASTOLIC BLOOD PRESSURE: 62 MMHG | HEART RATE: 84 BPM | BODY MASS INDEX: 34.58 KG/M2 | WEIGHT: 183 LBS | SYSTOLIC BLOOD PRESSURE: 104 MMHG

## 2020-02-04 PROCEDURE — G8417 CALC BMI ABV UP PARAM F/U: HCPCS | Performed by: OBSTETRICS & GYNECOLOGY

## 2020-02-04 PROCEDURE — G8482 FLU IMMUNIZE ORDER/ADMIN: HCPCS | Performed by: OBSTETRICS & GYNECOLOGY

## 2020-02-04 PROCEDURE — 99213 OFFICE O/P EST LOW 20 MIN: CPT | Performed by: OBSTETRICS & GYNECOLOGY

## 2020-02-04 PROCEDURE — 76818 FETAL BIOPHYS PROFILE W/NST: CPT | Performed by: OBSTETRICS & GYNECOLOGY

## 2020-02-04 PROCEDURE — G8427 DOCREV CUR MEDS BY ELIG CLIN: HCPCS | Performed by: OBSTETRICS & GYNECOLOGY

## 2020-02-04 PROCEDURE — 1036F TOBACCO NON-USER: CPT | Performed by: OBSTETRICS & GYNECOLOGY

## 2020-02-04 PROCEDURE — 2022F DILAT RTA XM EVC RTNOPTHY: CPT | Performed by: OBSTETRICS & GYNECOLOGY

## 2020-02-04 RX ORDER — GLUCOSAM/CHON-MSM1/C/MANG/BOSW 500-416.6
TABLET ORAL
Status: ON HOLD | COMMUNITY
Start: 2020-01-31 | End: 2020-03-20 | Stop reason: HOSPADM

## 2020-02-04 RX ORDER — DIPHENHYDRAMINE HCL 25 MG
TABLET ORAL
Status: ON HOLD | COMMUNITY
Start: 2020-01-31 | End: 2020-03-20 | Stop reason: HOSPADM

## 2020-02-04 RX ORDER — METRONIDAZOLE 500 MG/1
500 TABLET ORAL 2 TIMES DAILY
Qty: 14 TABLET | Refills: 0 | Status: SHIPPED | OUTPATIENT
Start: 2020-02-04 | End: 2020-02-11

## 2020-02-04 RX ORDER — CALCIUM CITRATE/VITAMIN D3 200MG-6.25
TABLET ORAL
Status: ON HOLD | COMMUNITY
Start: 2020-01-31 | End: 2020-03-20 | Stop reason: HOSPADM
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
## 2020-02-10 ENCOUNTER — HOSPITAL ENCOUNTER (OUTPATIENT)
Age: 25
Discharge: HOME | End: 2020-02-10
Attending: OBSTETRICS & GYNECOLOGY | Admitting: OBSTETRICS & GYNECOLOGY
Payer: COMMERCIAL

## 2020-02-10 VITALS
SYSTOLIC BLOOD PRESSURE: 113 MMHG | TEMPERATURE: 97.5 F | DIASTOLIC BLOOD PRESSURE: 61 MMHG | RESPIRATION RATE: 16 BRPM | HEART RATE: 84 BPM

## 2020-02-10 PROBLEM — Z3A.33 33 WEEKS GESTATION OF PREGNANCY: Status: ACTIVE | Noted: 2020-02-10

## 2020-02-10 LAB
-: NORMAL
AMORPHOUS: NORMAL
BACTERIA: NORMAL
BILIRUBIN URINE: NEGATIVE
CASTS UA: NORMAL /LPF (ref 0–8)
COLOR: YELLOW
COMMENT UA: ABNORMAL
CRYSTALS, UA: NORMAL /HPF
DIRECT EXAM: NORMAL
EPITHELIAL CELLS UA: NORMAL /HPF (ref 0–5)
GLUCOSE BLD-MCNC: 91 MG/DL (ref 65–105)
GLUCOSE URINE: NEGATIVE
KETONES, URINE: NEGATIVE
LEUKOCYTE ESTERASE, URINE: ABNORMAL
Lab: NORMAL
MUCUS: NORMAL
NITRITE, URINE: NEGATIVE
OTHER OBSERVATIONS UA: NORMAL
PH UA: 6.5 (ref 5–8)
PROTEIN UA: NEGATIVE
RBC UA: NORMAL /HPF (ref 0–4)
RENAL EPITHELIAL, UA: NORMAL /HPF
SPECIFIC GRAVITY UA: 1.03 (ref 1–1.03)
SPECIMEN DESCRIPTION: NORMAL
TRICHOMONAS: NORMAL
TURBIDITY: CLEAR
URINE HGB: ABNORMAL
UROBILINOGEN, URINE: NORMAL
WBC UA: NORMAL /HPF (ref 0–5)
YEAST: NORMAL

## 2020-02-10 PROCEDURE — 87086 URINE CULTURE/COLONY COUNT: CPT

## 2020-02-10 PROCEDURE — 6370000000 HC RX 637 (ALT 250 FOR IP): Performed by: STUDENT IN AN ORGANIZED HEALTH CARE EDUCATION/TRAINING PROGRAM

## 2020-02-10 PROCEDURE — 99213 OFFICE O/P EST LOW 20 MIN: CPT

## 2020-02-10 PROCEDURE — 87591 N.GONORRHOEAE DNA AMP PROB: CPT

## 2020-02-10 PROCEDURE — 87510 GARDNER VAG DNA DIR PROBE: CPT

## 2020-02-10 PROCEDURE — 87491 CHLMYD TRACH DNA AMP PROBE: CPT

## 2020-02-10 PROCEDURE — 82947 ASSAY GLUCOSE BLOOD QUANT: CPT

## 2020-02-10 PROCEDURE — 87660 TRICHOMONAS VAGIN DIR PROBE: CPT

## 2020-02-10 PROCEDURE — 87480 CANDIDA DNA DIR PROBE: CPT

## 2020-02-10 PROCEDURE — 81001 URINALYSIS AUTO W/SCOPE: CPT

## 2020-02-10 RX ORDER — ONDANSETRON 4 MG/1
4 TABLET, ORALLY DISINTEGRATING ORAL ONCE
Status: COMPLETED | OUTPATIENT
Start: 2020-02-10 | End: 2020-02-10

## 2020-02-10 RX ORDER — ACETAMINOPHEN 500 MG
1000 TABLET ORAL EVERY 6 HOURS PRN
Status: DISCONTINUED | OUTPATIENT
Start: 2020-02-10 | End: 2020-02-11 | Stop reason: HOSPADM

## 2020-02-10 RX ADMIN — ONDANSETRON 4 MG: 4 TABLET, ORALLY DISINTEGRATING ORAL at 21:27

## 2020-02-11 ENCOUNTER — ROUTINE PRENATAL (OUTPATIENT)
Dept: OBGYN CLINIC | Age: 25
End: 2020-02-11
Payer: COMMERCIAL

## 2020-02-11 VITALS
HEART RATE: 76 BPM | SYSTOLIC BLOOD PRESSURE: 109 MMHG | DIASTOLIC BLOOD PRESSURE: 58 MMHG | WEIGHT: 183 LBS | BODY MASS INDEX: 34.58 KG/M2

## 2020-02-11 PROBLEM — O98.219 GONORRHEA IN PREGNANCY: Status: ACTIVE | Noted: 2020-02-11

## 2020-02-11 LAB
C TRACH DNA GENITAL QL NAA+PROBE: NEGATIVE
CULTURE: NORMAL
Lab: NORMAL
N. GONORRHOEAE DNA: ABNORMAL
SPECIMEN DESCRIPTION: ABNORMAL
SPECIMEN DESCRIPTION: NORMAL

## 2020-02-11 PROCEDURE — G8427 DOCREV CUR MEDS BY ELIG CLIN: HCPCS | Performed by: NURSE PRACTITIONER

## 2020-02-11 PROCEDURE — 1036F TOBACCO NON-USER: CPT | Performed by: NURSE PRACTITIONER

## 2020-02-11 PROCEDURE — 2022F DILAT RTA XM EVC RTNOPTHY: CPT | Performed by: NURSE PRACTITIONER

## 2020-02-11 PROCEDURE — G8417 CALC BMI ABV UP PARAM F/U: HCPCS | Performed by: NURSE PRACTITIONER

## 2020-02-11 PROCEDURE — G8482 FLU IMMUNIZE ORDER/ADMIN: HCPCS | Performed by: NURSE PRACTITIONER

## 2020-02-11 PROCEDURE — 59025 FETAL NON-STRESS TEST: CPT | Performed by: NURSE PRACTITIONER

## 2020-02-11 PROCEDURE — 99213 OFFICE O/P EST LOW 20 MIN: CPT | Performed by: NURSE PRACTITIONER

## 2020-02-11 RX ORDER — CEFTRIAXONE SODIUM 250 MG/1
250 INJECTION, POWDER, FOR SOLUTION INTRAMUSCULAR; INTRAVENOUS ONCE
Status: COMPLETED | OUTPATIENT
Start: 2020-02-11 | End: 2020-02-11

## 2020-02-11 RX ADMIN — CEFTRIAXONE SODIUM 250 MG: 250 INJECTION, POWDER, FOR SOLUTION INTRAMUSCULAR; INTRAVENOUS at 13:00
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
## 2020-02-11 NOTE — FLOWSHEET NOTE
Pt arrived to labor and delivery with complaints of cramping and pressure in the lower abd for about a day. Pt states she has also been dry heaving, sweating and shaking. Every time she eats she feels \"sick\". Pt denies any vaginal discharge, bleeding. No recent sick contact. Positive fetal movment.

## 2020-02-13 ENCOUNTER — ROUTINE PRENATAL (OUTPATIENT)
Dept: PERINATAL CARE | Age: 25
End: 2020-02-13
Payer: COMMERCIAL

## 2020-02-13 VITALS
WEIGHT: 185 LBS | SYSTOLIC BLOOD PRESSURE: 118 MMHG | DIASTOLIC BLOOD PRESSURE: 56 MMHG | HEIGHT: 61 IN | TEMPERATURE: 97.7 F | BODY MASS INDEX: 34.93 KG/M2 | RESPIRATION RATE: 16 BRPM | HEART RATE: 66 BPM

## 2020-02-13 PROCEDURE — 76819 FETAL BIOPHYS PROFIL W/O NST: CPT | Performed by: OBSTETRICS & GYNECOLOGY

## 2020-02-13 PROCEDURE — 76805 OB US >/= 14 WKS SNGL FETUS: CPT | Performed by: OBSTETRICS & GYNECOLOGY

## 2020-02-13 PROCEDURE — 76820 UMBILICAL ARTERY ECHO: CPT | Performed by: OBSTETRICS & GYNECOLOGY

## 2020-02-13 RX ORDER — AZITHROMYCIN 500 MG/1
1000 TABLET, FILM COATED ORAL ONCE
Qty: 2 TABLET | Refills: 0 | Status: SHIPPED | OUTPATIENT
Start: 2020-02-13 | End: 2020-02-13

## 2020-02-13 RX ORDER — CEFTRIAXONE 1 G/1
250 INJECTION, POWDER, FOR SOLUTION INTRAMUSCULAR; INTRAVENOUS ONCE
Qty: 0.25 G | Refills: 0 | Status: SHIPPED | OUTPATIENT
Start: 2020-02-13 | End: 2020-02-13 | Stop reason: CLARIF

## 2020-02-17 ENCOUNTER — ROUTINE PRENATAL (OUTPATIENT)
Dept: OBGYN CLINIC | Age: 25
End: 2020-02-17
Payer: COMMERCIAL

## 2020-02-17 VITALS
DIASTOLIC BLOOD PRESSURE: 62 MMHG | WEIGHT: 186 LBS | HEART RATE: 79 BPM | SYSTOLIC BLOOD PRESSURE: 104 MMHG | BODY MASS INDEX: 35.14 KG/M2

## 2020-02-17 PROCEDURE — 2022F DILAT RTA XM EVC RTNOPTHY: CPT | Performed by: NURSE PRACTITIONER

## 2020-02-17 PROCEDURE — G8427 DOCREV CUR MEDS BY ELIG CLIN: HCPCS | Performed by: NURSE PRACTITIONER

## 2020-02-17 PROCEDURE — 59025 FETAL NON-STRESS TEST: CPT | Performed by: NURSE PRACTITIONER

## 2020-02-17 PROCEDURE — 99213 OFFICE O/P EST LOW 20 MIN: CPT | Performed by: NURSE PRACTITIONER

## 2020-02-17 PROCEDURE — G8482 FLU IMMUNIZE ORDER/ADMIN: HCPCS | Performed by: NURSE PRACTITIONER

## 2020-02-17 PROCEDURE — G8417 CALC BMI ABV UP PARAM F/U: HCPCS | Performed by: NURSE PRACTITIONER

## 2020-02-17 PROCEDURE — 1036F TOBACCO NON-USER: CPT | Performed by: NURSE PRACTITIONER
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
## 2020-02-20 ENCOUNTER — ROUTINE PRENATAL (OUTPATIENT)
Dept: PERINATAL CARE | Age: 25
End: 2020-02-20
Payer: COMMERCIAL

## 2020-02-20 VITALS
SYSTOLIC BLOOD PRESSURE: 111 MMHG | HEIGHT: 61 IN | DIASTOLIC BLOOD PRESSURE: 61 MMHG | RESPIRATION RATE: 16 BRPM | TEMPERATURE: 97.8 F | WEIGHT: 186 LBS | BODY MASS INDEX: 35.12 KG/M2 | HEART RATE: 82 BPM

## 2020-02-20 PROCEDURE — G8417 CALC BMI ABV UP PARAM F/U: HCPCS | Performed by: OBSTETRICS & GYNECOLOGY

## 2020-02-20 PROCEDURE — G8427 DOCREV CUR MEDS BY ELIG CLIN: HCPCS | Performed by: OBSTETRICS & GYNECOLOGY

## 2020-02-20 PROCEDURE — 76820 UMBILICAL ARTERY ECHO: CPT | Performed by: OBSTETRICS & GYNECOLOGY

## 2020-02-20 PROCEDURE — 99242 OFF/OP CONSLTJ NEW/EST SF 20: CPT | Performed by: OBSTETRICS & GYNECOLOGY

## 2020-02-20 PROCEDURE — 76818 FETAL BIOPHYS PROFILE W/NST: CPT | Performed by: OBSTETRICS & GYNECOLOGY

## 2020-02-20 PROCEDURE — G8482 FLU IMMUNIZE ORDER/ADMIN: HCPCS | Performed by: OBSTETRICS & GYNECOLOGY

## 2020-02-24 ENCOUNTER — ROUTINE PRENATAL (OUTPATIENT)
Dept: OBGYN CLINIC | Age: 25
End: 2020-02-24
Payer: COMMERCIAL

## 2020-02-24 ENCOUNTER — HOSPITAL ENCOUNTER (OUTPATIENT)
Age: 25
Setting detail: SPECIMEN
Discharge: HOME | End: 2020-02-24
Payer: COMMERCIAL

## 2020-02-24 VITALS
HEART RATE: 86 BPM | BODY MASS INDEX: 35.14 KG/M2 | SYSTOLIC BLOOD PRESSURE: 110 MMHG | DIASTOLIC BLOOD PRESSURE: 67 MMHG | WEIGHT: 186 LBS

## 2020-02-24 LAB
DIRECT EXAM: NORMAL
Lab: NORMAL
SPECIMEN DESCRIPTION: NORMAL

## 2020-02-24 PROCEDURE — 36415 COLL VENOUS BLD VENIPUNCTURE: CPT

## 2020-02-24 PROCEDURE — 99213 OFFICE O/P EST LOW 20 MIN: CPT | Performed by: ADVANCED PRACTICE MIDWIFE

## 2020-02-24 PROCEDURE — 87660 TRICHOMONAS VAGIN DIR PROBE: CPT

## 2020-02-24 PROCEDURE — 87591 N.GONORRHOEAE DNA AMP PROB: CPT

## 2020-02-24 PROCEDURE — G8427 DOCREV CUR MEDS BY ELIG CLIN: HCPCS | Performed by: ADVANCED PRACTICE MIDWIFE

## 2020-02-24 PROCEDURE — 87510 GARDNER VAG DNA DIR PROBE: CPT

## 2020-02-24 PROCEDURE — 2022F DILAT RTA XM EVC RTNOPTHY: CPT | Performed by: ADVANCED PRACTICE MIDWIFE

## 2020-02-24 PROCEDURE — G8482 FLU IMMUNIZE ORDER/ADMIN: HCPCS | Performed by: ADVANCED PRACTICE MIDWIFE

## 2020-02-24 PROCEDURE — 59025 FETAL NON-STRESS TEST: CPT | Performed by: ADVANCED PRACTICE MIDWIFE

## 2020-02-24 PROCEDURE — 1036F TOBACCO NON-USER: CPT | Performed by: ADVANCED PRACTICE MIDWIFE

## 2020-02-24 PROCEDURE — G8417 CALC BMI ABV UP PARAM F/U: HCPCS | Performed by: ADVANCED PRACTICE MIDWIFE

## 2020-02-24 PROCEDURE — 87491 CHLMYD TRACH DNA AMP PROBE: CPT

## 2020-02-24 PROCEDURE — 87480 CANDIDA DNA DIR PROBE: CPT

## 2020-02-24 NOTE — PROGRESS NOTES
Hives 09/01/2017       Group Beta Strep collection was completed. No next visit  GBS Results:   No visits with results within 1 Week(s) from this visit. Latest known visit with results is:   Admission on 02/10/2020, Discharged on 02/10/2020   Component Date Value Ref Range Status    Color, UA 02/10/2020 YELLOW  YELLOW Final    Turbidity UA 02/10/2020 CLEAR  CLEAR Final    Glucose, Ur 02/10/2020 NEGATIVE  NEGATIVE Final    Bilirubin Urine 02/10/2020 NEGATIVE  NEGATIVE Final    Ketones, Urine 02/10/2020 NEGATIVE  NEGATIVE Final    Specific Gravity, UA 02/10/2020 1.027  1.005 - 1.030 Final    Urine Hgb 02/10/2020 MODERATE* NEGATIVE Final    pH, UA 02/10/2020 6.5  5.0 - 8.0 Final    Protein, UA 02/10/2020 NEGATIVE  NEGATIVE Final    Urobilinogen, Urine 02/10/2020 Normal  Normal Final    Nitrite, Urine 02/10/2020 NEGATIVE  NEGATIVE Final    Leukocyte Esterase, Urine 02/10/2020 TRACE* NEGATIVE Final    Urinalysis Comments 02/10/2020 NOT REPORTED   Final    Specimen Description 02/10/2020 . VAGINA   Final    Special Requests 02/10/2020 NOT REPORTED   Final    Direct Exam 02/10/2020 NEGATIVE for Gardnerella vaginalis   Final    Direct Exam 02/10/2020 NEGATIVE for Candida sp. Final    Direct Exam 02/10/2020 NEGATIVE for Trichomonas vaginalis   Final    Direct Exam 02/10/2020 Method of testing is a DNA probe intended for detection and identification of Candida species, Gardnerella vaginalis, and Trichomonas vaginalis nucleic acid in vaginal fluid specimens from patients with symptoms of vaginitis/vaginosis. Final    Specimen Description 02/10/2020 . CERVIX   Final    C. trachomatis DNA 02/10/2020 NEGATIVE  NEGATIVE Final    Comment: CHLAMYDIA TRACHOMATIS DNA not detected by nucleic acid amplification. This test is intended for medical purposes only and is not valid for the evaluation of   suspected sexual abuse or for other forensic purposes.   In certain contexts, culture may be required to

## 2020-02-25 ENCOUNTER — ROUTINE PRENATAL (OUTPATIENT)
Dept: PERINATAL CARE | Age: 25
End: 2020-02-25
Payer: COMMERCIAL

## 2020-02-25 VITALS
WEIGHT: 182 LBS | DIASTOLIC BLOOD PRESSURE: 68 MMHG | HEART RATE: 92 BPM | RESPIRATION RATE: 16 BRPM | SYSTOLIC BLOOD PRESSURE: 118 MMHG | BODY MASS INDEX: 34.36 KG/M2 | TEMPERATURE: 97.8 F | HEIGHT: 61 IN

## 2020-02-25 PROCEDURE — 76820 UMBILICAL ARTERY ECHO: CPT | Performed by: OBSTETRICS & GYNECOLOGY

## 2020-02-25 PROCEDURE — 76819 FETAL BIOPHYS PROFIL W/O NST: CPT | Performed by: OBSTETRICS & GYNECOLOGY

## 2020-02-25 NOTE — PROGRESS NOTES
Pt did not bring blood sugars- did not take FBS this am    Pt did not give a urine specimen at the time of vitals.

## 2020-03-03 ENCOUNTER — ROUTINE PRENATAL (OUTPATIENT)
Dept: OBGYN CLINIC | Age: 25
End: 2020-03-03
Payer: COMMERCIAL

## 2020-03-03 ENCOUNTER — HOSPITAL ENCOUNTER (OUTPATIENT)
Age: 25
Setting detail: SPECIMEN
Discharge: HOME | End: 2020-03-03
Payer: COMMERCIAL

## 2020-03-03 VITALS
BODY MASS INDEX: 35.33 KG/M2 | SYSTOLIC BLOOD PRESSURE: 115 MMHG | DIASTOLIC BLOOD PRESSURE: 67 MMHG | HEART RATE: 72 BPM | WEIGHT: 187 LBS

## 2020-03-03 LAB
HIV AG/AB: NONREACTIVE
T. PALLIDUM, IGG: NONREACTIVE

## 2020-03-03 PROCEDURE — 87081 CULTURE SCREEN ONLY: CPT

## 2020-03-03 PROCEDURE — G8427 DOCREV CUR MEDS BY ELIG CLIN: HCPCS | Performed by: NURSE PRACTITIONER

## 2020-03-03 PROCEDURE — 1036F TOBACCO NON-USER: CPT | Performed by: NURSE PRACTITIONER

## 2020-03-03 PROCEDURE — 59025 FETAL NON-STRESS TEST: CPT | Performed by: NURSE PRACTITIONER

## 2020-03-03 PROCEDURE — 99213 OFFICE O/P EST LOW 20 MIN: CPT | Performed by: NURSE PRACTITIONER

## 2020-03-03 PROCEDURE — 86780 TREPONEMA PALLIDUM: CPT

## 2020-03-03 PROCEDURE — 36415 COLL VENOUS BLD VENIPUNCTURE: CPT

## 2020-03-03 PROCEDURE — 87389 HIV-1 AG W/HIV-1&-2 AB AG IA: CPT

## 2020-03-03 PROCEDURE — G8482 FLU IMMUNIZE ORDER/ADMIN: HCPCS | Performed by: NURSE PRACTITIONER

## 2020-03-03 PROCEDURE — G8417 CALC BMI ABV UP PARAM F/U: HCPCS | Performed by: NURSE PRACTITIONER

## 2020-03-03 PROCEDURE — 2022F DILAT RTA XM EVC RTNOPTHY: CPT | Performed by: NURSE PRACTITIONER

## 2020-03-05 ENCOUNTER — ROUTINE PRENATAL (OUTPATIENT)
Dept: PERINATAL CARE | Age: 25
End: 2020-03-05
Payer: COMMERCIAL

## 2020-03-05 VITALS
RESPIRATION RATE: 16 BRPM | BODY MASS INDEX: 35.5 KG/M2 | TEMPERATURE: 98 F | WEIGHT: 188 LBS | HEART RATE: 62 BPM | HEIGHT: 61 IN | SYSTOLIC BLOOD PRESSURE: 112 MMHG | DIASTOLIC BLOOD PRESSURE: 71 MMHG

## 2020-03-05 PROCEDURE — G8417 CALC BMI ABV UP PARAM F/U: HCPCS | Performed by: OBSTETRICS & GYNECOLOGY

## 2020-03-05 PROCEDURE — 76818 FETAL BIOPHYS PROFILE W/NST: CPT | Performed by: OBSTETRICS & GYNECOLOGY

## 2020-03-05 PROCEDURE — 76816 OB US FOLLOW-UP PER FETUS: CPT | Performed by: OBSTETRICS & GYNECOLOGY

## 2020-03-05 PROCEDURE — 99211 OFF/OP EST MAY X REQ PHY/QHP: CPT | Performed by: OBSTETRICS & GYNECOLOGY

## 2020-03-05 PROCEDURE — 76820 UMBILICAL ARTERY ECHO: CPT | Performed by: OBSTETRICS & GYNECOLOGY

## 2020-03-05 PROCEDURE — G8427 DOCREV CUR MEDS BY ELIG CLIN: HCPCS | Performed by: OBSTETRICS & GYNECOLOGY

## 2020-03-06 ENCOUNTER — TELEPHONE (OUTPATIENT)
Dept: PERINATAL CARE | Age: 25
End: 2020-03-06

## 2020-03-06 LAB
CULTURE: NORMAL
Lab: NORMAL
SPECIMEN DESCRIPTION: NORMAL

## 2020-03-06 NOTE — TELEPHONE ENCOUNTER
Rx called into the [facility] for Novolog (needles/syringes/vial/pen) insulin 6 units before dinner a one month supply with no refills. Rx called in by [facility] per Dr. Benjamin Vale.

## 2020-03-10 ENCOUNTER — ROUTINE PRENATAL (OUTPATIENT)
Dept: OBGYN CLINIC | Age: 25
End: 2020-03-10
Payer: COMMERCIAL

## 2020-03-10 VITALS
WEIGHT: 187 LBS | HEART RATE: 77 BPM | BODY MASS INDEX: 35.33 KG/M2 | SYSTOLIC BLOOD PRESSURE: 116 MMHG | DIASTOLIC BLOOD PRESSURE: 78 MMHG

## 2020-03-10 PROCEDURE — G8482 FLU IMMUNIZE ORDER/ADMIN: HCPCS | Performed by: OBSTETRICS & GYNECOLOGY

## 2020-03-10 PROCEDURE — 99213 OFFICE O/P EST LOW 20 MIN: CPT | Performed by: OBSTETRICS & GYNECOLOGY

## 2020-03-10 PROCEDURE — 1036F TOBACCO NON-USER: CPT | Performed by: OBSTETRICS & GYNECOLOGY

## 2020-03-10 PROCEDURE — G8427 DOCREV CUR MEDS BY ELIG CLIN: HCPCS | Performed by: OBSTETRICS & GYNECOLOGY

## 2020-03-10 PROCEDURE — 2022F DILAT RTA XM EVC RTNOPTHY: CPT | Performed by: OBSTETRICS & GYNECOLOGY

## 2020-03-10 PROCEDURE — 59025 FETAL NON-STRESS TEST: CPT | Performed by: OBSTETRICS & GYNECOLOGY

## 2020-03-10 PROCEDURE — G8417 CALC BMI ABV UP PARAM F/U: HCPCS | Performed by: OBSTETRICS & GYNECOLOGY

## 2020-03-10 NOTE — PROGRESS NOTES
Michelle Setting is a 21 y.o. female 37w5d        OB History    Para Term  AB Living   2 1 1     1   SAB TAB Ectopic Molar Multiple Live Births             1      # Outcome Date GA Lbr Francisco J/2nd Weight Sex Delivery Anes PTL Lv   2 Current            1 Term 2012 40w0d  6 lb 5 oz (2.863 kg) F    [facility]ACKSON       Vitals  BP: 116/78  Weight: 187 lb (84.8 kg)  Pulse: 77  Patient Position: Sitting  Albumin: Trace  Glucose: Negative      The patient was seen and evaluated. There was positive fetal movements. No contractions or leakage of fluid. Signs and symptoms of labor were reviewed. The S/S of Pre-Eclampsia were reviewed with the patient in detail. She is to report any of these if they occur. She currently denies any of these. The patient was instructed on fetal kick counts and was given a kick sheet to complete every 8 hours. She was instructed that the baby should move at a minimum of ten times within one hour after a meal. The patient was instructed to lay down on her left side twenty minutes after eating and count movements for up to one hour with a target value of ten movements. She was instructed to notify the office if she did not make that target after two attempts or if after any attempt there was less than four movements. The patient reports that the targets have been made Yes.    2020 TDAP given  2020- pt would like tdap, but is getting over having flu.   19 - Positive BV will need Flagyl after 15 weeks gestation    10/9/19 patient accepted and given influenza vaccine    12/10/2019  testing       T-Dap Vaccine Completed (27-36 weeks): Completed     Allergies: Allergies as of 03/10/2020 - Review Complete 03/10/2020   Allergen Reaction Noted    Sulfamethoxazole-trimethoprim Hives 2017         Group Beta Strep collection was completed.  Yes  GBS Results:   Hospital Outpatient Visit on 2020   Component Date Value Ref Range Status    HIV Ag/Ab 03/03/2020 NONREACTIVE  NONREACTIVE Final    Comment: No laboratory evidence of HIV infection. If acute HIV infection is suspected, consider   testing for HIV-1 RNA.  T. pallidum, IgG 03/03/2020 NONREACTIVE  NONREACTIVE Final    Comment:       T. pallidum antibodies are not detected. There is no serological evidence of infection with T. pallidum (early primary syphilis   cannot be excluded). Retest in 2-4 weeks if syphilis is clinically suspect.  Specimen Description 03/03/2020 . VAGINA   Final    Special Requests 03/03/2020 NOT REPORTED   Final    Culture 03/03/2020 NEGATIVE FOR GROUP B STREPTOCOCCI   Final   Hospital Outpatient Visit on 02/24/2020   Component Date Value Ref Range Status    Specimen Description 02/24/2020 . VAGINA   Final    Special Requests 02/24/2020 NOT REPORTED   Final    Direct Exam 02/24/2020 NEGATIVE for Candida sp. Final    Direct Exam 02/24/2020 NEGATIVE for Gardnerella vaginalis   Final    Direct Exam 02/24/2020 NEGATIVE for Trichomonas vaginalis   Final    Direct Exam 02/24/2020 Method of testing is a DNA probe intended for detection and identification of Candida species, Gardnerella vaginalis, and Trichomonas vaginalis nucleic acid in vaginal fluid specimens from patients with symptoms of vaginitis/vaginosis. Final    Specimen Description 02/24/2020 . CERVIX   Final    C. trachomatis DNA 02/24/2020 NEGATIVE  NEGATIVE Final    Comment: CHLAMYDIA TRACHOMATIS DNA not detected by nucleic acid amplification. This test is intended for medical purposes only and is not valid for the evaluation of   suspected sexual abuse or for other forensic purposes. In certain contexts, culture may be required to meet applicable laws and regulations for   diagnosis of C. trachomatis and N. gonorrhoeae infections. Per 2014  CDC recommendations, this test does not include confirmation of positive results   by an alternative nucleic acid target.       N. gonorrhoeae DNA 2020 NEGATIVE  NEGATIVE Final    Comment: NEISSERIA GONORRHOEAE DNA not detected by nucleic acid amplification. This test is intended for medical purposes only and is not valid for the evaluation of   suspected sexual abuse or for other forensic purposes. In certain contexts, culture may be required to meet applicable laws and regulations for   diagnosis of C. trachomatis and N. gonorrhoeae infections. Per 2014  CDC recommendations, this test does not include confirmation of positive results   by an alternative nucleic acid target.     ]        Cervical Exam was:   fingertip cm dilated    The literature regarding a questionable link to pitocin augmentation and induction of labor, the assistance of labor contractions and the initiation of contractions to help delivery, have been reviewed with the patient regarding the increased potential of having a  with Attention Deficit Hyperactivity Disorder and or Autism. These two disorders and the ramifications of their impact on a child and the family caring for that child has been reviewed with the patient in detail. She was given the risks, benefits and alternatives of the use of this medication. She has agreed to its use in the delivery of her unborn child if needed at the time of delivery, Yes. The patient was counseled on the mandatory call ahead policy. She has been instructed to call the office at anytime prior to going into the hospital so the on-call physician may direct her to the appropriate facility for care. Exceptions were reviewed including but not limited to: Decreased fetal movement, vaginal Bleeding or hemorrhage, trauma, readily expectant delivery, or any instance where she feels 911 should be utilized. The patient was counseled on Labor & Delivery. Route of delivery and counseling on vaginal, operative vaginal, and  sections were completed with the risks of each to both the patient as well as her baby.  The medicine. Be sure to discuss these risks with your doctor before the procedure. What to Expect   Prior to Procedure   While the same instructions apply for an induced labor as for a spontaneous birth, there are some differences. Do not eat too much before arriving at the hospital. (It is okay to have clear fluids, though.) The medicines can create very strong contractions and could upset your stomach. Contractions slow the digestive process, so your stomach will remain full. This can cause a problem if you need general anesthesia . Description of the Procedure Cervical Ripening   To deliver your baby vaginally, the cervix will need to [address] 8.  This means it needs to soften, thin, and open to prepare for delivery. If your cervix is not doing this already, your doctor may aid this process by giving you medicine, which may be a:   Gel that is applied to the cervix   Suppository put in the vagina   Pill taken by mouth   The cervical ripening process can last for up to a few days. There are also procedures that your doctor may try to aid cervical ripening, such as:   Strip the membranes (separate your cervix from the tissues around the baby's head)   Expand a small balloon-tipped catheter in the uterus   Place small cylinders that contain a type of sponge-like seaweed into the uterus     Changes in the Cervix During Pregnancy       © 2011 [address] [place] [address].   Contractions   If contractions have not started once your cervix is ripe, your doctor will give you a drug that causes contractions. The drug is a man-made version of a hormone called oxytocin. This hormone is produced by your body during active labor. The drug will be adjusted during labor to strengthen or weaken the contractions. Once contractions begin, the labor and birth process will be the same as a spontaneous delivery.    Anesthesia   The same pain medicines are available for an induced labor as for a spontaneous delivery, including:   Pain medicine given into your vein   Epidural block   Spinal block   Local anesthesia   Immediately After Procedure   If everything goes well, after the induction you will vaginally deliver a healthy baby. How Long Will It Take? It can be hours to several days (very rarely) from the time you are induced until the delivery. If your cervix is not ripe when you are scheduled for the induction, labor and delivery could take 2-3 days. It could take longer for first-time mothers and for pre-term babies. How Much Will It Hurt? Labor causes severe pain. Talk to your doctor about ways to manage the pain. [address] Stay   The usual length of stay is 1-3 days. Your doctor may choose to keep you longer if you have any problems. Postoperative Care   The care after an induced labor is the same as for a spontaneous birth. Call Your Doctor   When you go home after having a vaginal delivery, call your doctor if any of the following occurs: An unexplained fever of 100.4°F (38°C) or above in the first two weeks   Soaking more than one sanitary napkin an hour or if the bleeding level increases   Wounds that become red, swollen, or drain pus   New pain, swelling, or tenderness in your legs   Hot-to-the-touch, significantly reddened, sore breasts   Any cracking or bleeding from the nipple or areola (the dark-colored area of the breast)   Foul-smelling vaginal discharge   Painful urination or a sudden urge to urinate, inability to control urination   Increasing pain in the vaginal area   Cough or chest pain, nausea, or vomiting   Depression, hallucinations, suicidal thoughts, or any thoughts of harming your baby   In case of an emergency, CALL 911 . Pt requesting IOL 39 weeks.  Pt scheduled for IOL 3/18 10 pm

## 2020-03-12 ENCOUNTER — ROUTINE PRENATAL (OUTPATIENT)
Dept: PERINATAL CARE | Age: 25
End: 2020-03-12
Payer: COMMERCIAL

## 2020-03-12 ENCOUNTER — HOSPITAL ENCOUNTER (OUTPATIENT)
Dept: DIABETES SERVICES | Age: 25
Setting detail: THERAPIES SERIES
Discharge: HOME | End: 2020-03-12
Payer: COMMERCIAL

## 2020-03-12 VITALS
BODY MASS INDEX: 35.3 KG/M2 | SYSTOLIC BLOOD PRESSURE: 93 MMHG | RESPIRATION RATE: 16 BRPM | HEART RATE: 90 BPM | WEIGHT: 187 LBS | HEIGHT: 61 IN | DIASTOLIC BLOOD PRESSURE: 64 MMHG | TEMPERATURE: 98.2 F

## 2020-03-12 PROCEDURE — G0108 DIAB MANAGE TRN  PER INDIV: HCPCS

## 2020-03-12 PROCEDURE — 76820 UMBILICAL ARTERY ECHO: CPT | Performed by: OBSTETRICS & GYNECOLOGY

## 2020-03-12 PROCEDURE — 76818 FETAL BIOPHYS PROFILE W/NST: CPT | Performed by: OBSTETRICS & GYNECOLOGY

## 2020-03-12 NOTE — PROGRESS NOTES
Diabetes Education Progress Note  Patient was scheduled for appt at 1 27 - she did now show on time. Patient was called at 1:45pm and stated she forgot - she was offered appt at 3pm today and accepted. Patient with orders for insulin start - 6 units NOVOLOG with dinner. She stated [facility] did not have insulin in. Writer called to [facility] on [address] at 2 :15pm  and spoke with Janet Ball who stated yes Novolog was not available prior and Novolog was in today and patient could  her insulin pen of novolog today after 4pm.    Paula Valdovinos here for education about Diabetes and Pregnancy. Estimated Date of Delivery: 3/26/20 with planned induction on next wed 3/18/2020. Teaching provided at this session included:   _X__ Definition of gestational diabetes    _X_ Risk factors   _X__ Effects on pregnancy       _ X_ Management   _X__ Self-monitoring of blood sugar (FBS and 2 hrs postprandial)   _X__ Blood sugar targets FBS 65-90 and <120 2 hrs postprandial)   _X__ Insulin   _x__ Pen     Novolog role and action times   _x___Hyperglycemia  _x__ Hypoglycemia and treatment           Meal planning:   _X_  Avoid fruit juice and sugary beverages. _X_  Aim to eat small frequent meals and snacks. (ie., 3 + 3)                _X__  Eat balanced meals according to divided dinner plate sample            Planned follow-up:     _x_  Patient defers scheduling another appointment at this time               __   Follow up planned for prn.               _X_ Patient is to report blood glucose test results to physician as directed by  prescribing physician. Resource materials provided today include: [place] Gestational Diabetes Postpartum Care Learning [employer]: GDM meal Toolkit, Blood Glucose monitoring sheets, Articles, \"Benefits of Breast feeding\" and NDEP, \"Preventing Type 2 Diabetes,\" and Insulin Instruction Sheets.     Set diabetes self management goals of SMBG- check fasting and 2 hours PP, eat 3 meals and 3 snacks/day, avoid sugary beverages. She stated she has cut way back on drinking regular pop, down to only 1 beverage per day and drinking more water. Patient  practiced  use of pen method for insulin self administration and could verbalize plan for dose of 6 units with dinner. She is planning to go to [address] [place] [address] to  insulin after this appt ended.     Rene Johnson RN CDE

## 2020-03-18 ENCOUNTER — HOSPITAL ENCOUNTER (INPATIENT)
Age: 25
LOS: 3 days | Discharge: HOME | DRG: 560 | End: 2020-03-21
Attending: OBSTETRICS & GYNECOLOGY | Admitting: OBSTETRICS & GYNECOLOGY
Payer: COMMERCIAL

## 2020-03-18 ENCOUNTER — APPOINTMENT (OUTPATIENT)
Dept: LABOR AND DELIVERY | Age: 25
DRG: 560 | End: 2020-03-18
Payer: COMMERCIAL

## 2020-03-18 PROBLEM — Z92.89 HISTORY OF BLOOD TRANSFUSION: Status: ACTIVE | Noted: 2020-03-18

## 2020-03-18 PROBLEM — O09.93 HRP (HIGH RISK PREGNANCY), THIRD TRIMESTER: Status: ACTIVE | Noted: 2020-03-18

## 2020-03-18 PROBLEM — Z3A.33 33 WEEKS GESTATION OF PREGNANCY: Status: RESOLVED | Noted: 2020-02-10 | Resolved: 2020-03-18

## 2020-03-18 PROBLEM — F12.10 TETRAHYDROCANNABINOL (THC) USE DISORDER, MILD, ABUSE: Status: ACTIVE | Noted: 2020-03-18

## 2020-03-18 LAB
-: ABNORMAL
ABO/RH: NORMAL
ABSOLUTE EOS #: 0.13 K/UL (ref 0–0.4)
ABSOLUTE IMMATURE GRANULOCYTE: ABNORMAL K/UL (ref 0–0.3)
ABSOLUTE LYMPH #: 3.89 K/UL (ref 1–4.8)
ABSOLUTE MONO #: 1.07 K/UL (ref 0.1–1.3)
AMORPHOUS: ABNORMAL
AMPHETAMINE SCREEN URINE: NEGATIVE
ANTIBODY SCREEN: NEGATIVE
ARM BAND NUMBER: NORMAL
BACTERIA: ABNORMAL
BARBITURATE SCREEN URINE: NEGATIVE
BASOPHILS # BLD: 1 % (ref 0–2)
BASOPHILS ABSOLUTE: 0.13 K/UL (ref 0–0.2)
BENZODIAZEPINE SCREEN, URINE: NEGATIVE
BILIRUBIN URINE: ABNORMAL
BUPRENORPHINE URINE: ABNORMAL
CANNABINOID SCREEN URINE: POSITIVE
CASTS UA: ABNORMAL /LPF
COCAINE METABOLITE, URINE: NEGATIVE
COLOR: YELLOW
COMMENT UA: ABNORMAL
CRYSTALS, UA: ABNORMAL /HPF
DIFFERENTIAL TYPE: ABNORMAL
EOSINOPHILS RELATIVE PERCENT: 1 % (ref 0–4)
EPITHELIAL CELLS UA: ABNORMAL /HPF
EXPIRATION DATE: NORMAL
GLUCOSE BLD-MCNC: 65 MG/DL (ref 65–105)
GLUCOSE URINE: NEGATIVE
HCT VFR BLD CALC: 33.2 % (ref 36–46)
HEMOGLOBIN: 10.9 G/DL (ref 12–16)
IMMATURE GRANULOCYTES: ABNORMAL %
KETONES, URINE: NEGATIVE
LEUKOCYTE ESTERASE, URINE: NEGATIVE
LYMPHOCYTES # BLD: 29 % (ref 24–44)
MCH RBC QN AUTO: 27.8 PG (ref 26–34)
MCHC RBC AUTO-ENTMCNC: 32.9 G/DL (ref 31–37)
MCV RBC AUTO: 84.4 FL (ref 80–100)
MDMA URINE: ABNORMAL
METHADONE SCREEN, URINE: NEGATIVE
METHAMPHETAMINE, URINE: ABNORMAL
MONOCYTES # BLD: 8 % (ref 1–7)
MORPHOLOGY: NORMAL
MUCUS: ABNORMAL
NITRITE, URINE: NEGATIVE
NRBC AUTOMATED: ABNORMAL PER 100 WBC
OPIATES, URINE: NEGATIVE
OTHER OBSERVATIONS UA: ABNORMAL
OXYCODONE SCREEN URINE: NEGATIVE
PDW BLD-RTO: 14.3 % (ref 11.5–14.9)
PH UA: 6 (ref 5–8)
PHENCYCLIDINE, URINE: NEGATIVE
PLATELET # BLD: 212 K/UL (ref 150–450)
PLATELET ESTIMATE: ABNORMAL
PMV BLD AUTO: 11.8 FL (ref 6–12)
PROPOXYPHENE, URINE: ABNORMAL
PROTEIN UA: ABNORMAL
RBC # BLD: 3.93 M/UL (ref 4–5.2)
RBC # BLD: ABNORMAL 10*6/UL
RBC UA: ABNORMAL /HPF
RENAL EPITHELIAL, UA: ABNORMAL /HPF
SEG NEUTROPHILS: 61 % (ref 36–66)
SEGMENTED NEUTROPHILS ABSOLUTE COUNT: 8.18 K/UL (ref 1.3–9.1)
SPECIFIC GRAVITY UA: 1.04 (ref 1–1.03)
TEST INFORMATION: ABNORMAL
TRICHOMONAS: ABNORMAL
TRICYCLIC ANTIDEPRESSANTS, UR: ABNORMAL
TURBIDITY: ABNORMAL
URINE HGB: NEGATIVE
UROBILINOGEN, URINE: NORMAL
WBC # BLD: 13.4 K/UL (ref 3.5–11)
WBC # BLD: ABNORMAL 10*3/UL
WBC UA: ABNORMAL /HPF
YEAST: ABNORMAL

## 2020-03-18 PROCEDURE — 59200 INSERT CERVICAL DILATOR: CPT

## 2020-03-18 PROCEDURE — 86850 RBC ANTIBODY SCREEN: CPT

## 2020-03-18 PROCEDURE — 86901 BLOOD TYPING SEROLOGIC RH(D): CPT

## 2020-03-18 PROCEDURE — 81001 URINALYSIS AUTO W/SCOPE: CPT

## 2020-03-18 PROCEDURE — 80307 DRUG TEST PRSMV CHEM ANLYZR: CPT

## 2020-03-18 PROCEDURE — 82947 ASSAY GLUCOSE BLOOD QUANT: CPT

## 2020-03-18 PROCEDURE — 2580000003 HC RX 258: Performed by: STUDENT IN AN ORGANIZED HEALTH CARE EDUCATION/TRAINING PROGRAM

## 2020-03-18 PROCEDURE — 76815 OB US LIMITED FETUS(S): CPT

## 2020-03-18 PROCEDURE — 86900 BLOOD TYPING SEROLOGIC ABO: CPT

## 2020-03-18 PROCEDURE — 86780 TREPONEMA PALLIDUM: CPT

## 2020-03-18 PROCEDURE — 36415 COLL VENOUS BLD VENIPUNCTURE: CPT

## 2020-03-18 PROCEDURE — 1220000000 HC SEMI PRIVATE OB R&B

## 2020-03-18 PROCEDURE — 85025 COMPLETE CBC W/AUTO DIFF WBC: CPT

## 2020-03-18 RX ORDER — SODIUM CHLORIDE 9 MG/ML
INJECTION, SOLUTION INTRAVENOUS CONTINUOUS
Status: DISCONTINUED | OUTPATIENT
Start: 2020-03-18 | End: 2020-03-19

## 2020-03-18 RX ORDER — ONDANSETRON 2 MG/ML
4 INJECTION INTRAMUSCULAR; INTRAVENOUS EVERY 6 HOURS PRN
Status: DISCONTINUED | OUTPATIENT
Start: 2020-03-18 | End: 2020-03-19 | Stop reason: SDUPTHER

## 2020-03-18 RX ORDER — SODIUM CHLORIDE 0.9 % (FLUSH) 0.9 %
10 SYRINGE (ML) INJECTION PRN
Status: DISCONTINUED | OUTPATIENT
Start: 2020-03-18 | End: 2020-03-19

## 2020-03-18 RX ORDER — SODIUM CHLORIDE 0.9 % (FLUSH) 0.9 %
10 SYRINGE (ML) INJECTION EVERY 12 HOURS SCHEDULED
Status: DISCONTINUED | OUTPATIENT
Start: 2020-03-18 | End: 2020-03-19

## 2020-03-18 RX ORDER — ACETAMINOPHEN 500 MG
1000 TABLET ORAL EVERY 6 HOURS PRN
Status: DISCONTINUED | OUTPATIENT
Start: 2020-03-18 | End: 2020-03-19

## 2020-03-18 RX ADMIN — SODIUM CHLORIDE: 9 INJECTION, SOLUTION INTRAVENOUS at 22:40

## 2020-03-19 ENCOUNTER — ANESTHESIA EVENT (OUTPATIENT)
Dept: LABOR AND DELIVERY | Age: 25
DRG: 560 | End: 2020-03-19
Payer: COMMERCIAL

## 2020-03-19 ENCOUNTER — ANESTHESIA (OUTPATIENT)
Dept: LABOR AND DELIVERY | Age: 25
DRG: 560 | End: 2020-03-19
Payer: COMMERCIAL

## 2020-03-19 LAB
GLUCOSE BLD-MCNC: 107 MG/DL (ref 65–105)
GLUCOSE BLD-MCNC: 76 MG/DL (ref 65–105)
GLUCOSE BLD-MCNC: 90 MG/DL (ref 65–105)
T. PALLIDUM, IGG: NONREACTIVE

## 2020-03-19 PROCEDURE — 6360000002 HC RX W HCPCS: Performed by: STUDENT IN AN ORGANIZED HEALTH CARE EDUCATION/TRAINING PROGRAM

## 2020-03-19 PROCEDURE — 2580000003 HC RX 258: Performed by: STUDENT IN AN ORGANIZED HEALTH CARE EDUCATION/TRAINING PROGRAM

## 2020-03-19 PROCEDURE — 7200000001 HC VAGINAL DELIVERY

## 2020-03-19 PROCEDURE — 3700000025 EPIDURAL BLOCK: Performed by: ANESTHESIOLOGY

## 2020-03-19 PROCEDURE — 3E0P7VZ INTRODUCTION OF HORMONE INTO FEMALE REPRODUCTIVE, VIA NATURAL OR ARTIFICIAL OPENING: ICD-10-PCS | Performed by: OBSTETRICS & GYNECOLOGY

## 2020-03-19 PROCEDURE — 2500000003 HC RX 250 WO HCPCS: Performed by: ANESTHESIOLOGY

## 2020-03-19 PROCEDURE — 3E033VJ INTRODUCTION OF OTHER HORMONE INTO PERIPHERAL VEIN, PERCUTANEOUS APPROACH: ICD-10-PCS | Performed by: OBSTETRICS & GYNECOLOGY

## 2020-03-19 PROCEDURE — 88307 TISSUE EXAM BY PATHOLOGIST: CPT

## 2020-03-19 PROCEDURE — 82947 ASSAY GLUCOSE BLOOD QUANT: CPT

## 2020-03-19 PROCEDURE — 59409 OBSTETRICAL CARE: CPT | Performed by: OBSTETRICS & GYNECOLOGY

## 2020-03-19 PROCEDURE — 1220000000 HC SEMI PRIVATE OB R&B

## 2020-03-19 PROCEDURE — 10907ZC DRAINAGE OF AMNIOTIC FLUID, THERAPEUTIC FROM PRODUCTS OF CONCEPTION, VIA NATURAL OR ARTIFICIAL OPENING: ICD-10-PCS | Performed by: OBSTETRICS & GYNECOLOGY

## 2020-03-19 PROCEDURE — 6370000000 HC RX 637 (ALT 250 FOR IP): Performed by: STUDENT IN AN ORGANIZED HEALTH CARE EDUCATION/TRAINING PROGRAM

## 2020-03-19 RX ORDER — NAPROXEN 500 MG/1
500 TABLET ORAL EVERY 12 HOURS
Status: DISCONTINUED | OUTPATIENT
Start: 2020-03-19 | End: 2020-03-21 | Stop reason: HOSPADM

## 2020-03-19 RX ORDER — NALOXONE HYDROCHLORIDE 0.4 MG/ML
0.4 INJECTION, SOLUTION INTRAMUSCULAR; INTRAVENOUS; SUBCUTANEOUS PRN
Status: DISCONTINUED | OUTPATIENT
Start: 2020-03-19 | End: 2020-03-19

## 2020-03-19 RX ORDER — DOCUSATE SODIUM 100 MG/1
100 CAPSULE, LIQUID FILLED ORAL 2 TIMES DAILY
Status: DISCONTINUED | OUTPATIENT
Start: 2020-03-19 | End: 2020-03-21 | Stop reason: HOSPADM

## 2020-03-19 RX ORDER — NALBUPHINE HCL 10 MG/ML
5 AMPUL (ML) INJECTION EVERY 4 HOURS PRN
Status: DISCONTINUED | OUTPATIENT
Start: 2020-03-19 | End: 2020-03-19

## 2020-03-19 RX ORDER — EPHEDRINE SULFATE 50 MG/ML
10 INJECTION INTRAVENOUS EVERY 5 MIN PRN
Status: DISCONTINUED | OUTPATIENT
Start: 2020-03-19 | End: 2020-03-19

## 2020-03-19 RX ORDER — ONDANSETRON 2 MG/ML
4 INJECTION INTRAMUSCULAR; INTRAVENOUS EVERY 6 HOURS PRN
Status: DISCONTINUED | OUTPATIENT
Start: 2020-03-19 | End: 2020-03-19

## 2020-03-19 RX ORDER — ACETAMINOPHEN 500 MG
1000 TABLET ORAL EVERY 6 HOURS PRN
Status: DISCONTINUED | OUTPATIENT
Start: 2020-03-19 | End: 2020-03-21 | Stop reason: HOSPADM

## 2020-03-19 RX ORDER — ONDANSETRON 4 MG/1
4 TABLET, FILM COATED ORAL EVERY 6 HOURS PRN
Status: DISCONTINUED | OUTPATIENT
Start: 2020-03-19 | End: 2020-03-21 | Stop reason: HOSPADM

## 2020-03-19 RX ORDER — SIMETHICONE 80 MG
80 TABLET,CHEWABLE ORAL EVERY 6 HOURS PRN
Status: DISCONTINUED | OUTPATIENT
Start: 2020-03-19 | End: 2020-03-21 | Stop reason: HOSPADM

## 2020-03-19 RX ORDER — LANOLIN 100 %
OINTMENT (GRAM) TOPICAL PRN
Status: DISCONTINUED | OUTPATIENT
Start: 2020-03-19 | End: 2020-03-21 | Stop reason: HOSPADM

## 2020-03-19 RX ORDER — BISACODYL 10 MG
10 SUPPOSITORY, RECTAL RECTAL DAILY PRN
Status: DISCONTINUED | OUTPATIENT
Start: 2020-03-19 | End: 2020-03-21 | Stop reason: HOSPADM

## 2020-03-19 RX ADMIN — Medication 8 ML/HR: at 18:01

## 2020-03-19 RX ADMIN — SODIUM CHLORIDE: 9 INJECTION, SOLUTION INTRAVENOUS at 20:44

## 2020-03-19 RX ADMIN — Medication 10 ML/HR: at 17:58

## 2020-03-19 RX ADMIN — DINOPROSTONE 10 MG: 10 INSERT VAGINAL at 00:06

## 2020-03-19 RX ADMIN — SODIUM CHLORIDE: 9 INJECTION, SOLUTION INTRAVENOUS at 12:45

## 2020-03-19 RX ADMIN — ACETAMINOPHEN 1000 MG: 500 TABLET, FILM COATED ORAL at 19:24

## 2020-03-19 RX ADMIN — ACETAMINOPHEN 1000 MG: 500 TABLET, FILM COATED ORAL at 07:35

## 2020-03-19 RX ADMIN — Medication 1 MILLI-UNITS/MIN: at 12:42

## 2020-03-19 ASSESSMENT — PAIN DESCRIPTION - DESCRIPTORS: DESCRIPTORS: DISCOMFORT

## 2020-03-19 ASSESSMENT — PAIN SCALES - GENERAL
PAINLEVEL_OUTOF10: 4
PAINLEVEL_OUTOF10: 3

## 2020-03-19 ASSESSMENT — ENCOUNTER SYMPTOMS: STRIDOR: 0

## 2020-03-19 NOTE — PLAN OF CARE
Problem: Anxiety:  Goal: Level of anxiety will decrease  Description: Level of anxiety will decrease  3/19/2020 1452 by Renee Peck RN  Outcome: Ongoing  3/19/2020 0633 by Lala Mike RN  Outcome: Ongoing     Problem: Breathing Pattern - Ineffective:  Goal: Able to breathe comfortably  Description: Able to breathe comfortably  3/19/2020 1452 by Renee Peck RN  Outcome: Ongoing  3/19/2020 0633 by Lala Mike RN  Outcome: Ongoing     Problem: Pain:  Goal: Pain level will decrease  Description: Pain level will decrease  Outcome: Ongoing  Goal: Control of acute pain  Description: Control of acute pain  Outcome: Ongoing  Goal: Control of chronic pain  Description: Control of chronic pain  Outcome: Ongoing

## 2020-03-19 NOTE — PROGRESS NOTES
Labor Progress Note    Tiny Knapp is a 21 y.o. female  at 39w0d  The patient was seen and examined. Her pain is well controlled. She reports fetal movement is present, denies contractions, denies loss of fluid, denies vaginal bleeding. Patient denies any headache, visual changes, difficulty breathing, RUQ pain, N/V, F/C, and pain/swelling in lower extremities. Denies any dysuria or vaginal discharge. SVE performed and patient tolerated exam well. Vital Signs:  Vitals:    20 1414 20 1444 20 1513 20 1544   BP: 123/68 122/69 136/72 128/71   Pulse: 52 53 54 53   Resp: 16 16 16 16   Temp: 97.9 °F (36.6 °C) 98.1 °F (36.7 °C) 97.7 °F (36.5 °C) 97.7 °F (36.5 °C)   TempSrc: Infrared Infrared Infrared Infrared   SpO2:       Weight:       Height:             FHT: 120, moderate variability, accelerations present, early deceleration and intermittent variable decelerations   Contractions: regular, every 2-3 minutes  Cervical Exam: 1 cm dilated, 90 effaced, -0 station  Pitocin: @ 8 mu/min    Membranes: ruptured, clear fluid  Scalp Electrode in place: absent  Intrauterine Pressure Catheter in Place: absent      Assessment/Plan:  Tiny Knapp is a 21 y.o. female  at 39w0d admitted for IOL 2/2 GDMA2   - GBS negative, No indication for GBS prophylaxis   - VSS, afebrile   - cEFM/TOCO   - S/p Cervidil x1   - S/p AROM/IUPC @5817   - Continue pitocin per protocol    GDMA2   - Clear liquid diet    - FBS, 2h PP blood sugars, will need active orders   - Novalog 6U with dinner.  Will plan to give if patient is eating.    - Blood glucose has been well controlled during admission         Dr. Pedro Saenz updated and in agreement with plan    Serenity Craig DO  Ob/Gyn Resident  3/19/2020, 4:19 PM
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
## 2020-03-19 NOTE — PROGRESS NOTES
Labor Progress Note    Tiny Knapp is a 21 y.o. female  at 39w0d  The patient was seen and examined. Her pain is well controlled. She reports fetal movement is present, denies contractions, denies loss of fluid, denies vaginal bleeding. Patient denies any headache, visual changes, difficulty breathing, RUQ pain, N/V, F/C, and pain/swelling in lower extremities. Denies any dysuria or vaginal discharge. SVE performed and patient tolerated exam well. Vital Signs:  Vitals:    20 1242 20 1313 20 1343 20 1414   BP: 105/69 117/68 105/61 123/68   Pulse: 56 56 54 52   Resp: 16 16 16 16   Temp: 97.7 °F (36.5 °C) 97.5 °F (36.4 °C)  97.9 °F (36.6 °C)   TempSrc: Infrared Infrared  Infrared   SpO2:       Weight:       Height:             FHT: 120, moderate variability, accelerations present, decelerations absent  Contractions: regular, every 2-3 minutes  Cervical Exam: 1 cm dilated, 70 effaced, -1 station  Pitocin: @ 4 mu/min    Membranes: Intact  Scalp Electrode in place: absent  Intrauterine Pressure Catheter in Place: absent      Assessment/Plan:  Tiny Knapp is a 21 y.o. female  at 39w0d admitted for IOL 2/2 GDMA2   - GBS negative, No indication for GBS prophylaxis   - VSS, afebrile   - cEFM/TOCO   - S/p Cervidil x1   - Continue pitocin per protocol    GDMA2   - Clear liquid diet    - FBS, 2h PP blood sugars, will need active orders   - Novalog 6U with dinner.  Will plan to give if patient is eating.    - Blood glucose has been well controlled during admission         Dr. Pedro Saenz updated and in agreement with plan    Serenity Craig DO  Ob/Gyn Resident  3/19/2020, 2:31 PM

## 2020-03-19 NOTE — PROGRESS NOTES
Immature Granulocyte 03/18/2020 NOT REPORTED  0.00 - 0.30 k/uL Final    WBC Morphology 03/18/2020 NOT REPORTED   Final    RBC Morphology 03/18/2020 NOT REPORTED   Final    Platelet Estimate 05/52/2898 NOT REPORTED   Final    Seg Neutrophils 03/18/2020 61  36 - 66 % Final    Lymphocytes 03/18/2020 29  24 - 44 % Final    Monocytes 03/18/2020 8* 1 - 7 % Final    Eosinophils % 03/18/2020 1  0 - 4 % Final    Basophils 03/18/2020 1  0 - 2 % Final    Segs Absolute 03/18/2020 8.18  1.3 - 9.1 k/uL Final    Absolute Lymph # 03/18/2020 3.89  1.0 - 4.8 k/uL Final    Absolute Mono # 03/18/2020 1.07  0.1 - 1.3 k/uL Final    Absolute Eos # 03/18/2020 0.13  0.0 - 0.4 k/uL Final    Basophils Absolute 03/18/2020 0.13  0.0 - 0.2 k/uL Final    Morphology 03/18/2020 Normal   Final    Expiration Date 03/18/2020 03/21/2020,2359   Final    Arm Band Number 03/18/2020 [id number]   Final    ABO/Rh 03/18/2020 O POSITIVE   Final    Antibody Screen 03/18/2020 NEGATIVE   Final    T. pallidum, IgG 03/18/2020 NONREACTIVE  NONREACTIVE Final    Comment:       T. pallidum antibodies are not detected. There is no serological evidence of infection with T. pallidum (early primary syphilis   cannot be excluded). Retest in 2-4 weeks if syphilis is clinically suspect.             Amphetamine Screen, Ur 03/18/2020 NEGATIVE  NEGATIVE Final    Comment:       (Positive cutoff 1000 ng/mL)                  Barbiturate Screen, Ur 03/18/2020 NEGATIVE  NEGATIVE Final    Comment:       (Positive cutoff 200 ng/mL)                  Benzodiazepine Screen, Urine 03/18/2020 NEGATIVE  NEGATIVE Final    Comment:       (Positive cutoff 200 ng/mL)                  Cocaine Metabolite, Urine 03/18/2020 NEGATIVE  NEGATIVE Final    Comment:       (Positive cutoff 300 ng/mL)                  Methadone Screen, Urine 03/18/2020 NEGATIVE  NEGATIVE Final    Comment:       (Positive cutoff 300 ng/mL)                  Opiates, Urine 03/18/2020 NEGATIVE  NEGATIVE Final    Comment:       (Positive cutoff 300 ng/mL)                  Phencyclidine, Urine 03/18/2020 NEGATIVE  NEGATIVE Final    Comment:       (Positive cutoff 25 ng/mL)                  Propoxyphene, Urine 03/18/2020 NOT REPORTED  NEGATIVE Final    Cannabinoid Scrn, Ur 03/18/2020 POSITIVE* NEGATIVE Final    Comment:       (Positive cutoff 50 ng/mL)                  Oxycodone Screen, Ur 03/18/2020 NEGATIVE  NEGATIVE Final    Comment:       (Positive cutoff 100 ng/mL)                  Methamphetamine, Urine 03/18/2020 NOT REPORTED  NEGATIVE Final    Tricyclic Antidepressants, Urine 03/18/2020 NOT REPORTED  NEGATIVE Final    MDMA, Urine 03/18/2020 NOT REPORTED  NEGATIVE Final    Buprenorphine Urine 03/18/2020 NOT REPORTED  NEGATIVE Final    Test Information 03/18/2020 Assay provides medical screening only. The absence of expected drug(s) and/or metabolite(s) may indicate diluted or adulterated urine, limitations of testing or timing of collection. Final    Comment: Testing for legal purposes should be confirmed by another method. To request confirmation   of test result, please call the lab within 7 days of sample submission.  Color, UA 03/18/2020 YELLOW  YELLOW Final    Turbidity UA 03/18/2020 CLOUDY* CLEAR Final    Glucose, Ur 03/18/2020 NEGATIVE  NEGATIVE Final    Bilirubin Urine 03/18/2020 Presumptive positive. Unable to confirm due to unavailability of reagent. * NEGATIVE Final    Ketones, Urine 03/18/2020 NEGATIVE  NEGATIVE Final    Specific Gravity, UA 03/18/2020 1.037* 1.000 - 1.030 Final    Urine Hgb 03/18/2020 NEGATIVE  NEGATIVE Final    pH, UA 03/18/2020 6.0  5.0 - 8.0 Final    Protein, UA 03/18/2020 1+* NEGATIVE Final    Urobilinogen, Urine 03/18/2020 Normal  Normal Final    Nitrite, Urine 03/18/2020 NEGATIVE  NEGATIVE Final    Leukocyte Esterase, Urine 03/18/2020 NEGATIVE  NEGATIVE Final    Urinalysis Comments 03/18/2020 NOT REPORTED   Final    - 03/18/2020        Final    species, Gardnerella vaginalis, and Trichomonas vaginalis nucleic acid in vaginal fluid specimens from patients with symptoms of vaginitis/vaginosis. Final    Specimen Description 2020 . CERVIX   Final    C. trachomatis DNA 2020 NEGATIVE  NEGATIVE Final    Comment: CHLAMYDIA TRACHOMATIS DNA not detected by nucleic acid amplification. This test is intended for medical purposes only and is not valid for the evaluation of   suspected sexual abuse or for other forensic purposes. In certain contexts, culture may be required to meet applicable laws and regulations for   diagnosis of C. trachomatis and N. gonorrhoeae infections. Per 2014  CDC recommendations, this test does not include confirmation of positive results   by an alternative nucleic acid target.  N. gonorrhoeae DNA 2020 NEGATIVE  NEGATIVE Final    Comment: NEISSERIA GONORRHOEAE DNA not detected by nucleic acid amplification. This test is intended for medical purposes only and is not valid for the evaluation of   suspected sexual abuse or for other forensic purposes. In certain contexts, culture may be required to meet applicable laws and regulations for   diagnosis of C. trachomatis and N. gonorrhoeae infections. Per 2014  CDC recommendations, this test does not include confirmation of positive results   by an alternative nucleic acid target.     ]    /62   Pulse 64   Temp 97.2 °F (36.2 °C) (Infrared)   Resp 16   Ht 5' 1\" (1.549 m)   Wt 187 lb (84.8 kg)   LMP  (LMP Unknown)   SpO2 99%   BMI 35.33 kg/m²       Pelvic Exam:  Cervix Check:     DILATION:  1 cm   EFFACEMENT:   70% soft   STATION:  -1 cm   CONSISTENCY:  soft   POSITION:  anterior    FETAL POSITION: Cephalic    Assessment:  Godfrey Knapp is a 21 y.o. female  39w0d     2.   OB History    Para Term  AB Living   2 1 1     1   SAB TAB Ectopic Molar Multiple Live Births             1      # Outcome Date GA Lbr Francisco J/2nd

## 2020-03-19 NOTE — PROGRESS NOTES
Labor Progress Note    Claudia Bains is a 21 y.o. female  at 39w0d  The patient was seen and examined. Her pain is well controlled. She reports fetal movement is present, denies contractions, denies loss of fluid, denies vaginal bleeding. Patient denies any headache, visual changes, difficulty breathing, RUQ pain, N/V, F/C, and pain/swelling in lower extremities. Denies any dysuria or vaginal discharge. SVE performed and patient tolerated exam well. Vital Signs:  Vitals:    20 1513 20 1544 20 1620 20 1655   BP: 136/72 128/71 131/76 129/68   Pulse: 54 53 (!) 48 52   Resp: 16 16 16 16   Temp: 97.7 °F (36.5 °C) 97.7 °F (36.5 °C)  96.6 °F (35.9 °C)   TempSrc: Infrared Infrared  Infrared   SpO2:       Weight:       Height:             FHT: 120, moderate variability, accelerations present, early decelerations  Contractions: regular, every 2 minutes  Cervical Exam: 2-3 cm dilated, 90 effaced, -0 station  Pitocin: @ 5 mu/min    Membranes: ruptured, clear fluid  Scalp Electrode in place: absent  Intrauterine Pressure Catheter in Place: absent      Assessment/Plan:  Claudia Bains is a 21 y.o. female  at 39w0d admitted for IOL 2/2 GDMA2   - GBS negative, No indication for GBS prophylaxis   - VSS, afebrile   - cEFM/TOCO   - S/p Cervidil x1   - S/p AROM/IUPC @2775   - Continue pitocin per protocol    GDMA2   - Clear liquid diet    - FBS, 2h PP blood sugars, will need active orders   - Novalog 6U with dinner.  Will plan to give if patient is eating.    - Blood glucose has been well controlled during admission         Dr. Emilia Carey updated and in agreement with plan    Elenita Alva DO  Ob/Gyn Resident  3/19/2020, 5:32 PM

## 2020-03-19 NOTE — PROGRESS NOTES
Labor Progress Note    Jarvis Richter is a 21 y.o. female  at 39w0d    The patient was seen and examined. Her pain is not well controlled and requesting an epidural. She reports fetal movement is present, complains of contractions, complains of loss of fluid, denies vaginal bleeding but there is some bloody show. SVE performed and remains unchanged. Patient tolerated well. Few late decelerations noted but FHT remains overall reassuring with acels and moderate variability. Pitocin turned off. Patient will be turned to side. IVF running. Will do oxygen if needed. Anesthesia on unit for epidural.     Vital Signs:  Vitals:    20 1513 20 1544 20 1620 20 1655   BP: 136/72 128/71 131/76 129/68   Pulse: 54 53 (!) 48 52   Resp: 16 16 16 16   Temp: 97.7 °F (36.5 °C) 97.7 °F (36.5 °C)  96.6 °F (35.9 °C)   TempSrc: Infrared Infrared  Infrared   SpO2:       Weight:       Height:           FHT: 110, moderate variability, accelerations present, decelerations, a few late decelerations, early decelerations present   Contractions: regular, every 2 to 3 minutes  Cervical Exam: 2 to 3 cm dilated, 90 effaced, 0 station  Pitocin: @ 4 mu/min--->0    Membranes: Ruptured clear fluid  Scalp Electrode in place: absent  Intrauterine Pressure Catheter in Place: present    Interventions: intrauterine resuscitation     Assessment/Plan:  Jarvis Richter is a 21 y.o. female  at 39w0d admitted for IOL 2/2 GDMA2   - GBS negative, No indication for GBS prophylaxis   - VSS. Afebrile    - CEFM and TOCO: Few late decelerations noted but FHT remains overall reassuring with acels and moderate variability. Early decels present. Regular contractions every 2 to 3 minutes   - IVF NS @125ml/hr-->bolus running    - Epidural   - Pitocin turned off for now.  Will reassess    - AROM/IUPC @2250   - S/P Cervidil x1   - CLD-->Will check sugars more often once in active labor   - Blood glucose has been well controlled during admission   - Novalog 6U w/ dinner (held as patient is on CLD)   - Previously discussed the risks of a shoulder dystocia for the general population but also discussed that she is at an increase risk due to patient's BMI and due to her gestational DM. Maternal and fetal risks discussed. Patient voiced understanding and wishes to proceed with an induction.   - Anesthesia in room for epidural. Will repeat SVE after.  Monitor for response to intrauterine resuscitation (pitocin off, IVF, oxygen, and position changes)     Dr. Pedro Saenz updated and in agreement with plan    Snehal Min DO  Ob/Gyn Resident  3/19/2020, 5:34 PM

## 2020-03-19 NOTE — CARE COORDINATION
Education information given to mother and she verbalizes understanding about the following:  Understanding your baby's  screening tests pamphlet. Hour for [employer]. Patient Safety Education. Infant security including the four band system and the HUGS system. Skin to Skin Contact for You and Your Baby. Benefits of breastfeeding. QR codes for videos online including: Breastfeeding Massage/Hand Express, Breastfeeding Positions, and Breastfeeding latch. Risks of formula given and discussed with mother. What do the experts say about the use of pacifiers/supplementation of a  infant? Safe sleep for your baby (supplied by [place])    Mother encouraged to review pamphlets and watch videos (if able). Mother chooses to breast feed.

## 2020-03-19 NOTE — PROGRESS NOTES
Labor Progress Note    Claudia Bains is a 21 y.o. female  at 39w0d  The patient was seen and examined. Her pain is well controlled. She reports fetal movement is present, denies contractions, denies loss of fluid, denies vaginal bleeding. Cervidil placed without difficulty. Patient tolerated well. Vital Signs:  Vitals:    20 2220   BP: 123/84   Pulse: 86   Resp: 16   Temp: 97.5 °F (36.4 °C)   TempSrc: Infrared     FHT: 120, moderate variability, accelerations present, decelerations absent  Contractions: irregular  Cervical Exam: FT cm dilated, 50 effaced, -1 out of 3 station  Pitocin: @ 0 mu/min    Membranes: Intact  Scalp Electrode in place: absent  Intrauterine Pressure Catheter in Place: absent    Interventions: cervidil placed     Assessment/Plan:  Claudia Bains is a 21 y.o. female  at 39w0d admitted for induction of labor 2/2 GDMA2   - GBS negative / Rh positive / R immune              - No indication for GBS prophylaxis              - cEFM/[place]              - IVF: NS @ 125ml/hr              - FBS, 2hr PP, will need active orders              - Carb Control Diet with cervidil               - Currently on 6U Novalog with dinner. Reports she took her insulin today. Hx of noncompliance. - Discussed the risks of a shoulder dystocia for the general population but also discussed that she is at an increase risk due to patient's BMI and due to her gestational DM. Maternal and fetal risks discussed. Patient voiced understanding and wishes to proceed with an induction.               - Cervidil placed     Attending updated and in agreement with plan    Hilary Duran DO  Ob/Gyn Resident  3/19/2020, 12:10 AM

## 2020-03-19 NOTE — ANESTHESIA PROCEDURE NOTES
Epidural Block    Patient location during procedure: OB  Start time: 3/19/2020 5:40 PM  End time: 3/19/2020 6:01 PM  Reason for block: labor epidural  Staffing  Anesthesiologist: Segun Pineda MD  Performed: anesthesiologist   Preanesthetic Checklist  Completed: patient identified, site marked, surgical consent, pre-op evaluation, timeout performed, IV checked, risks and benefits discussed, monitors and equipment checked, anesthesia consent given, oxygen available and patient being monitored  Epidural  Patient position: sitting  Prep: Betadine  Patient monitoring: cardiac monitor, continuous pulse ox and frequent blood pressure checks  Approach: midline  Location: lumbar (1-5)  Injection technique: DAVI air and DAVI saline  Provider prep: mask and sterile gloves  Needle  Needle type: Tuohy   Needle gauge: 17 G  Needle length: 3.5 in  Needle insertion depth: 8 cm  Catheter type: end hole  Catheter size: 19 G  Catheter at skin depth: 11 cm  Test dose: negative  Assessment  Hemodynamics: stable  Attempts: 1

## 2020-03-19 NOTE — PROGRESS NOTES
Labor Progress Note    Ramiro Prescott is a 21 y.o. female  at 39w0d  The patient was seen and examined. Her pain is well controlled. She reports fetal movement is present, denies contractions, denies loss of fluid, denies vaginal bleeding. Patient denies any headache, visual changes, difficulty breathing, RUQ pain, N/V, F/C, and pain/swelling in lower extremities. Denies any dysuria or vaginal discharge. Cervidil removed and SVE performed and patient tolerated exam well. Vital Signs:  Vitals:    20 0750 20 0805 20 0810 20 0936   BP:    110/62   Pulse:    64   Resp:    16   Temp:    97.2 °F (36.2 °C)   TempSrc:    Infrared   SpO2: 99% 99% 99%    Weight:       Height:             FHT: 120, moderate variability, accelerations present, decelerations absent  Contractions: irregular, every 1-5 minutes  Cervical Exam: 1 cm dilated, 70 effaced, -1 station  Pitocin: @ 0 mu/min    Membranes: Intact  Scalp Electrode in place: absent  Intrauterine Pressure Catheter in Place: absent      Assessment/Plan:  Ramiro Prescott is a 21 y.o. female  at 39w0d admitted for IOL 2/2 GDMA2   - GBS negative, No indication for GBS prophylaxis   - VSS, afebrile   - cEFM/TOCO   - S/p Cervidil x1   - Start pitocin per protocol    GDMA2   - Clear liquid diet once pitocin starts   - FBS, 2h PP blood sugars, will need active orders   - Novalog 6U with dinner.  Will plan to give if patient is eating.    - Blood glucose has been well controlled during admission         Dr. Alistair Menard updated and in agreement with plan    Franchester Hodgkin, DO  Ob/Gyn Resident  3/19/2020, 12:13 PM

## 2020-03-19 NOTE — PROGRESS NOTES
Labor Progress Note    Uma Ohara is a 21 y.o. female  at 39w0d    The patient was seen and examined as a deceleration was noted. Her pain is well controlled with her epidural. She reports fetal movement is present, complains of contractions, complains of loss of fluid, denies vaginal bleeding. RN was at bedside already performing intrauterine resuscitation. SVE performed. Patient tolerated well. Pitocin off. Oxygen was already on patient and an IVF bolus running. A 5 minute broken up prolong deceleration noted from baseline of 115 to 80 BPM. Responded to intrauterine resuscitation and now reassuring. Vital Signs:  Vitals:    20 1921 20 19220 19320 193   BP:       Pulse:       Resp:       Temp:       TempSrc:       SpO2: 99% 100% 99% 100%   Weight:       Height:         FHT: 115, moderate variability, accelerations present, decelerations, A 5 minute broken up prolong deceleration noted from baseline of 115 to 80 BPM. Responded to intrauterine resuscitation and now reassuring. Contractions: regular, every 2 to 5 minutes  Cervical Exam: 5 cm dilated, 90 effaced, 0 station  Pitocin: @ 1 mu/min--->0    Membranes: Ruptured clear fluid  Scalp Electrode in place: absent  Intrauterine Pressure Catheter in Place: present    Interventions: intrauterine resuscitation     Assessment/Plan:  Uma Ohara is a 21 y.o. female  at 39w0d admitted for IOL 2/2 GDMA2   - GBS negative, No indication for GBS prophylaxis   - VSS.  Afebrile    - CEFM and TOCO: Now Cat 1 after intrauterine resuscitation    - IVF NS @125ml/hr   - Epidural   - Pitocin off, will resume when able    - AROM/IUPC @1605   - S/P Cervidil x1   - CLD-->q4hrs and then q2hrs once in active labor.    - Blood glucose has been well controlled during admission   - Novalog 6U w/ dinner (held as patient is on CLD)   - Previously discussed the risks of a shoulder dystocia for the general population but also
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
## 2020-03-20 LAB
GLUCOSE BLD-MCNC: 82 MG/DL (ref 65–105)
HCT VFR BLD CALC: 31.9 % (ref 36–46)
HEMOGLOBIN: 10.3 G/DL (ref 12–16)

## 2020-03-20 PROCEDURE — 1220000000 HC SEMI PRIVATE OB R&B

## 2020-03-20 PROCEDURE — 6370000000 HC RX 637 (ALT 250 FOR IP): Performed by: STUDENT IN AN ORGANIZED HEALTH CARE EDUCATION/TRAINING PROGRAM

## 2020-03-20 PROCEDURE — 85014 HEMATOCRIT: CPT

## 2020-03-20 PROCEDURE — 85018 HEMOGLOBIN: CPT

## 2020-03-20 PROCEDURE — 82947 ASSAY GLUCOSE BLOOD QUANT: CPT

## 2020-03-20 PROCEDURE — 7200000001 HC VAGINAL DELIVERY

## 2020-03-20 PROCEDURE — 36415 COLL VENOUS BLD VENIPUNCTURE: CPT

## 2020-03-20 PROCEDURE — 59050 FETAL MONITOR W/REPORT: CPT

## 2020-03-20 RX ORDER — NAPROXEN 500 MG/1
500 TABLET ORAL EVERY 12 HOURS
Qty: 60 TABLET | Refills: 1 | Status: SHIPPED | OUTPATIENT
Start: 2020-03-20 | End: 2021-10-21 | Stop reason: ALTCHOICE

## 2020-03-20 RX ORDER — PSEUDOEPHEDRINE HCL 30 MG
100 TABLET ORAL 2 TIMES DAILY
Qty: 60 CAPSULE | Refills: 0 | Status: SHIPPED | OUTPATIENT
Start: 2020-03-20 | End: 2021-11-11

## 2020-03-20 RX ADMIN — NAPROXEN 500 MG: 500 TABLET ORAL at 04:30

## 2020-03-20 RX ADMIN — NAPROXEN 500 MG: 500 TABLET ORAL at 16:29

## 2020-03-20 RX ADMIN — DOCUSATE SODIUM 100 MG: 100 CAPSULE, LIQUID FILLED ORAL at 10:32

## 2020-03-20 ASSESSMENT — PAIN DESCRIPTION - PAIN TYPE
TYPE: ACUTE PAIN
TYPE: ACUTE PAIN

## 2020-03-20 ASSESSMENT — PAIN DESCRIPTION - ONSET: ONSET: GRADUAL

## 2020-03-20 ASSESSMENT — PAIN DESCRIPTION - PROGRESSION: CLINICAL_PROGRESSION: NOT CHANGED

## 2020-03-20 ASSESSMENT — PAIN DESCRIPTION - DESCRIPTORS
DESCRIPTORS: ACHING
DESCRIPTORS: ACHING

## 2020-03-20 ASSESSMENT — PAIN DESCRIPTION - LOCATION
LOCATION: GENERALIZED;BACK
LOCATION: GENERALIZED;BACK

## 2020-03-20 ASSESSMENT — PAIN DESCRIPTION - ORIENTATION: ORIENTATION: MID

## 2020-03-20 ASSESSMENT — PAIN SCALES - GENERAL
PAINLEVEL_OUTOF10: 4

## 2020-03-20 ASSESSMENT — PAIN DESCRIPTION - FREQUENCY: FREQUENCY: INTERMITTENT

## 2020-03-20 ASSESSMENT — PAIN - FUNCTIONAL ASSESSMENT: PAIN_FUNCTIONAL_ASSESSMENT: ACTIVITIES ARE NOT PREVENTED

## 2020-03-20 NOTE — PROGRESS NOTES
Immature Granulocyte 03/18/2020 NOT REPORTED  0.00 - 0.30 k/uL Final    WBC Morphology 03/18/2020 NOT REPORTED   Final    RBC Morphology 03/18/2020 NOT REPORTED   Final    Platelet Estimate 47/37/8785 NOT REPORTED   Final    Seg Neutrophils 03/18/2020 61  36 - 66 % Final    Lymphocytes 03/18/2020 29  24 - 44 % Final    Monocytes 03/18/2020 8* 1 - 7 % Final    Eosinophils % 03/18/2020 1  0 - 4 % Final    Basophils 03/18/2020 1  0 - 2 % Final    Segs Absolute 03/18/2020 8.18  1.3 - 9.1 k/uL Final    Absolute Lymph # 03/18/2020 3.89  1.0 - 4.8 k/uL Final    Absolute Mono # 03/18/2020 1.07  0.1 - 1.3 k/uL Final    Absolute Eos # 03/18/2020 0.13  0.0 - 0.4 k/uL Final    Basophils Absolute 03/18/2020 0.13  0.0 - 0.2 k/uL Final    Morphology 03/18/2020 Normal   Final    Expiration Date 03/18/2020 03/21/2020,2359   Final    Arm Band Number 03/18/2020 [id number]   Final    ABO/Rh 03/18/2020 O POSITIVE   Final    Antibody Screen 03/18/2020 NEGATIVE   Final    T. pallidum, IgG 03/18/2020 NONREACTIVE  NONREACTIVE Final    Comment:       T. pallidum antibodies are not detected. There is no serological evidence of infection with T. pallidum (early primary syphilis   cannot be excluded). Retest in 2-4 weeks if syphilis is clinically suspect.             Amphetamine Screen, Ur 03/18/2020 NEGATIVE  NEGATIVE Final    Comment:       (Positive cutoff 1000 ng/mL)                  Barbiturate Screen, Ur 03/18/2020 NEGATIVE  NEGATIVE Final    Comment:       (Positive cutoff 200 ng/mL)                  Benzodiazepine Screen, Urine 03/18/2020 NEGATIVE  NEGATIVE Final    Comment:       (Positive cutoff 200 ng/mL)                  Cocaine Metabolite, Urine 03/18/2020 NEGATIVE  NEGATIVE Final    Comment:       (Positive cutoff 300 ng/mL)                  Methadone Screen, Urine 03/18/2020 NEGATIVE  NEGATIVE Final    Comment:       (Positive cutoff 300 ng/mL)                  Opiates, Urine 03/18/2020 NEGATIVE  NEGATIVE WBC, UA 03/18/2020 0 TO 2  /HPF Final    RBC, UA 03/18/2020 0 TO 2  /HPF Final    Casts UA 03/18/2020 NOT REPORTED  /LPF Final    Crystals, UA 03/18/2020 NOT REPORTED  None /HPF Final    Epithelial Cells UA 03/18/2020 10 TO 20  /HPF Final    Renal Epithelial, UA 03/18/2020 NOT REPORTED  0 /HPF Final    Bacteria, UA 03/18/2020 FEW* None Final    Mucus, UA 03/18/2020 1+* None Final    Trichomonas, UA 03/18/2020 NOT REPORTED  None Final    Amorphous, UA 03/18/2020 1+* None Final    Other Observations UA 03/18/2020 NOT REPORTED  NOT REQ. Final    Yeast, UA 03/18/2020 NOT REPORTED  None Final    POC Glucose 03/18/2020 65  65 - 105 mg/dL Final    POC Glucose 03/19/2020 107* 65 - 105 mg/dL Final    POC Glucose 03/19/2020 90  65 - 105 mg/dL Final    POC Glucose 03/19/2020 76  65 - 105 mg/dL Final   Hospital Outpatient Visit on 03/03/2020   Component Date Value Ref Range Status    HIV Ag/Ab 03/03/2020 NONREACTIVE  NONREACTIVE Final    Comment: No laboratory evidence of HIV infection. If acute HIV infection is suspected, consider   testing for HIV-1 RNA.  T. pallidum, IgG 03/03/2020 NONREACTIVE  NONREACTIVE Final    Comment:       T. pallidum antibodies are not detected. There is no serological evidence of infection with T. pallidum (early primary syphilis   cannot be excluded). Retest in 2-4 weeks if syphilis is clinically suspect.  Specimen Description 03/03/2020 . VAGINA   Final    Special Requests 03/03/2020 NOT REPORTED   Final    Culture 03/03/2020 NEGATIVE FOR GROUP B STREPTOCOCCI   Final   Hospital Outpatient Visit on 02/24/2020   Component Date Value Ref Range Status    Specimen Description 02/24/2020 . VAGINA   Final    Special Requests 02/24/2020 NOT REPORTED   Final    Direct Exam 02/24/2020 NEGATIVE for Candida sp.    Final    Direct Exam 02/24/2020 NEGATIVE for Gardnerella vaginalis   Final    Direct Exam 02/24/2020 NEGATIVE for Trichomonas vaginalis   Final    Direct Exam 2020 Method of testing is a DNA probe intended for detection and identification of Candida species, Gardnerella vaginalis, and Trichomonas vaginalis nucleic acid in vaginal fluid specimens from patients with symptoms of vaginitis/vaginosis. Final    Specimen Description 2020 . CERVIX   Final    C. trachomatis DNA 2020 NEGATIVE  NEGATIVE Final    Comment: CHLAMYDIA TRACHOMATIS DNA not detected by nucleic acid amplification. This test is intended for medical purposes only and is not valid for the evaluation of   suspected sexual abuse or for other forensic purposes. In certain contexts, culture may be required to meet applicable laws and regulations for   diagnosis of C. trachomatis and N. gonorrhoeae infections. Per 2014  CDC recommendations, this test does not include confirmation of positive results   by an alternative nucleic acid target.  N. gonorrhoeae DNA 2020 NEGATIVE  NEGATIVE Final    Comment: NEISSERIA GONORRHOEAE DNA not detected by nucleic acid amplification. This test is intended for medical purposes only and is not valid for the evaluation of   suspected sexual abuse or for other forensic purposes. In certain contexts, culture may be required to meet applicable laws and regulations for   diagnosis of C. trachomatis and N. gonorrhoeae infections. Per 2014  CDC recommendations, this test does not include confirmation of positive results   by an alternative nucleic acid target.     ]    /69   Pulse 56   Temp 98.1 °F (36.7 °C) (Infrared)   Resp 16   Ht 5' 1\" (1.549 m)   Wt 187 lb (84.8 kg)   LMP  (LMP Unknown)   SpO2 100%   BMI 35.33 kg/m²       Pelvic Exam:  Cervix Check:     DILATION:  6-7 cm   EFFACEMENT:   100%   STATION:  0 cm   CONSISTENCY:  soft   POSITION:  anterior    FETAL POSITION: Cephalic    Assessment:  1.  Arline Gaucher is a 21 y.o. female  39w0d     2.   OB History    Para Term  AB Living   2 1 1     1
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
## 2020-03-20 NOTE — PROGRESS NOTES
taking them without talking to your doctor. · Do not share them. · Know what the results and side effects may be. Report bothersome side effects to your doctor. · Some drugs can be dangerous when mixed. Talk to a doctor or pharmacist if you are taking more than one drug. This includes over-the-counter medication and herb or dietary supplements. · Plan ahead for refills so you don't run out. Lifestyle Changes    Having a baby requires significant lifestyle changes. You and your doctor will plan lifestyle changes that will help you recover. Some things to keep in mind include:   · It is important to get enough rest so you can recover. Try sleeping when the baby sleeps. · Ask your doctor when you can resume sexual relations. If you have not done so already, talk to your doctor about birth control options. · If you are breastfeeding, consider a breast pump. · Call your obstetrician and/or pediatrician for any questions that arise. · Understand the changes your body is going through as it recovers from childbirth:   ¨ Hot and cold flashes as your body adjusts to new hormone and blood flow levels   ¨ Urinary or fecal incontinence due to stretched muscles   ¨ After pains from uterine contractions as the uterus shrinks   ¨ Vaginal bleeding (called lochia), which is heavier than your period (generally stops within two months)   ¨ Baby blues, feelings of irritability, sadness, crying, or anxiety. Postpartum depression is more severe, occurring in 10%-20% of new moms. If you experience such symptoms, contact your doctor. · Consider joining a support group for new mothers. You can get encouragement and learn parenting strategies. Follow-up   Schedule a follow-up appointment as directed by your doctor. Call Your Doctor If Any of the Following Occurs   It is important for you to monitor your recovery once you leave the hospital. That way, you can alert your doctor to any problems immediately.  If any of the

## 2020-03-21 VITALS
HEIGHT: 61 IN | TEMPERATURE: 96.8 F | BODY MASS INDEX: 35.3 KG/M2 | SYSTOLIC BLOOD PRESSURE: 137 MMHG | RESPIRATION RATE: 16 BRPM | HEART RATE: 58 BPM | DIASTOLIC BLOOD PRESSURE: 75 MMHG | OXYGEN SATURATION: 98 % | WEIGHT: 187 LBS

## 2020-03-21 PROCEDURE — 6370000000 HC RX 637 (ALT 250 FOR IP): Performed by: STUDENT IN AN ORGANIZED HEALTH CARE EDUCATION/TRAINING PROGRAM

## 2020-03-21 RX ADMIN — DOCUSATE SODIUM 100 MG: 100 CAPSULE, LIQUID FILLED ORAL at 07:47

## 2020-03-21 RX ADMIN — NAPROXEN 500 MG: 500 TABLET ORAL at 05:03

## 2020-03-21 RX ADMIN — ACETAMINOPHEN 1000 MG: 500 TABLET, FILM COATED ORAL at 07:48

## 2020-03-21 ASSESSMENT — PAIN DESCRIPTION - PROGRESSION
CLINICAL_PROGRESSION: GRADUALLY WORSENING

## 2020-03-21 ASSESSMENT — PAIN SCALES - GENERAL
PAINLEVEL_OUTOF10: 3
PAINLEVEL_OUTOF10: 4

## 2020-03-21 NOTE — CARE COORDINATION
Social Work- Received referral. Patient tested positive for [facility] and has history of PTSD. Spoke with patient. She states that she smoked marijuana due to lack of appetite during pregnancy. She does not remember the last time she smoked it. Discussed history of PTSD. She states that she also had PPD after her last birth. Discussed mental health resources and signs of PPD. The father of the baby is Sharda Fernandez. He does not live with patient, but he is still involved. She states that she has a good support system. She has everything she needs for the baby. Discussed that  will make referral to [facility]. Phone call to [facility]. They accepted patient and will follow up with patient in her home. Patient may leave the hospital with the baby.  Karl Rojas

## 2020-03-21 NOTE — PLAN OF CARE
Problem: Anxiety:  Goal: Level of anxiety will decrease  Outcome: Completed     Problem: Breathing Pattern - Ineffective:  Goal: Able to breathe comfortably  Outcome: Completed     Problem: Pain:  Goal: Pain level will decrease  Outcome: Completed  Goal: Control of acute pain  Outcome: Completed  Goal: Control of chronic pain  Outcome: Completed

## 2020-03-24 LAB — SURGICAL PATHOLOGY REPORT: NORMAL

## 2020-05-15 PROBLEM — O24.419 GESTATIONAL DIABETES MELLITUS (GDM) AFFECTING PREGNANCY, ANTEPARTUM: Status: RESOLVED | Noted: 2020-01-29 | Resolved: 2020-05-15

## 2020-05-15 PROBLEM — Z87.828 HISTORY OF GUNSHOT WOUND: Status: RESOLVED | Noted: 2019-09-11 | Resolved: 2020-05-15

## 2020-05-15 PROBLEM — Z90.81 H/O SPLENECTOMY: Status: RESOLVED | Noted: 2019-09-11 | Resolved: 2020-05-15

## 2020-05-15 PROBLEM — O34.40 HISTORY OF CRYOSURGERY OF CERVIX AFFECTING PREGNANCY: Status: RESOLVED | Noted: 2019-09-11 | Resolved: 2020-05-15

## 2020-05-15 PROBLEM — Z98.890 HISTORY OF CRYOSURGERY OF CERVIX AFFECTING PREGNANCY: Status: RESOLVED | Noted: 2019-09-11 | Resolved: 2020-05-15

## 2020-05-15 PROBLEM — O28.9 ABNORMAL FIRST TRIMESTER SCREEN: Status: RESOLVED | Noted: 2019-12-30 | Resolved: 2020-05-15

## 2020-05-15 PROBLEM — O98.219 GONORRHEA IN PREGNANCY: Status: RESOLVED | Noted: 2020-02-11 | Resolved: 2020-05-15

## 2021-05-04 ENCOUNTER — TELEPHONE (OUTPATIENT)
Dept: OBGYN CLINIC | Age: 26
End: 2021-05-04

## 2021-10-21 ENCOUNTER — INITIAL PRENATAL (OUTPATIENT)
Dept: OBGYN CLINIC | Age: 26
End: 2021-10-21
Payer: COMMERCIAL

## 2021-10-21 DIAGNOSIS — Z3A.35 35 WEEKS GESTATION OF PREGNANCY: ICD-10-CM

## 2021-10-21 DIAGNOSIS — O09.33 LATE PRENATAL CARE AFFECTING PREGNANCY IN THIRD TRIMESTER: ICD-10-CM

## 2021-10-21 DIAGNOSIS — F32.0 CURRENT MILD EPISODE OF MAJOR DEPRESSIVE DISORDER WITHOUT PRIOR EPISODE (HCC): ICD-10-CM

## 2021-10-21 DIAGNOSIS — O34.43 HISTORY OF CRYOSURGERY OF CERVIX AFFECTING PREGNANCY IN THIRD TRIMESTER: ICD-10-CM

## 2021-10-21 DIAGNOSIS — O09.93 SUPERVISION OF HIGH RISK PREGNANCY IN THIRD TRIMESTER: Primary | ICD-10-CM

## 2021-10-21 DIAGNOSIS — O09.93 HIGH-RISK PREGNANCY IN THIRD TRIMESTER: ICD-10-CM

## 2021-10-21 DIAGNOSIS — Z90.81 H/O SPLENECTOMY: ICD-10-CM

## 2021-10-21 DIAGNOSIS — O09.93 HIGH-RISK PREGNANCY IN THIRD TRIMESTER: Primary | ICD-10-CM

## 2021-10-21 DIAGNOSIS — O99.013 ANEMIA AFFECTING PREGNANCY IN THIRD TRIMESTER: ICD-10-CM

## 2021-10-21 DIAGNOSIS — Z98.890 HISTORY OF CRYOSURGERY OF CERVIX AFFECTING PREGNANCY IN THIRD TRIMESTER: ICD-10-CM

## 2021-10-21 DIAGNOSIS — F32.0 CURRENT MILD EPISODE OF MAJOR DEPRESSIVE DISORDER WITHOUT PRIOR EPISODE (HCC): Primary | ICD-10-CM

## 2021-10-21 PROBLEM — O99.210 OBESITY AFFECTING PREGNANCY: Status: ACTIVE | Noted: 2021-09-28

## 2021-10-21 PROBLEM — F43.10 PTSD (POST-TRAUMATIC STRESS DISORDER): Status: ACTIVE | Noted: 2021-09-28

## 2021-10-21 PROBLEM — Z86.32 HISTORY OF GESTATIONAL DIABETES IN PRIOR PREGNANCY, CURRENTLY PREGNANT: Status: ACTIVE | Noted: 2021-09-28

## 2021-10-21 PROBLEM — F17.210 NICOTINE DEPENDENCE, CIGARETTES, UNCOMPLICATED: Status: ACTIVE | Noted: 2018-03-30

## 2021-10-21 PROBLEM — O09.299 HISTORY OF GESTATIONAL DIABETES IN PRIOR PREGNANCY, CURRENTLY PREGNANT: Status: ACTIVE | Noted: 2021-09-28

## 2021-10-21 PROBLEM — O09.90 HIGH-RISK PREGNANCY: Status: ACTIVE | Noted: 2021-10-21

## 2021-10-21 PROBLEM — F41.9 ANXIETY DURING PREGNANCY, ANTEPARTUM: Status: ACTIVE | Noted: 2021-09-28

## 2021-10-21 PROBLEM — O99.340 ANXIETY DURING PREGNANCY, ANTEPARTUM: Status: ACTIVE | Noted: 2021-09-28

## 2021-10-21 PROCEDURE — H1000 PRENATAL CARE ATRISK ASSESSM: HCPCS | Performed by: NURSE PRACTITIONER

## 2021-10-21 RX ORDER — FERROUS SULFATE 325(65) MG
TABLET ORAL
COMMUNITY
Start: 2021-09-01 | End: 2021-11-11

## 2021-10-21 RX ORDER — VITAMIN A, VITAMIN C, VITAMIN D-3, VITAMIN E, VITAMIN B-1, VITAMIN B-2, NIACIN, VITAMIN B-6, CALCIUM, IRON, ZINC, COPPER 4000; 120; 400; 22; 1.84; 3; 20; 10; 1; 12; 200; 27; 25; 2 [IU]/1; MG/1; [IU]/1; MG/1; MG/1; MG/1; MG/1; MG/1; MG/1; UG/1; MG/1; MG/1; MG/1; MG/1
TABLET ORAL
COMMUNITY
End: 2021-11-04 | Stop reason: ALTCHOICE

## 2021-10-21 RX ORDER — DOCUSATE SODIUM 100 MG/1
CAPSULE ORAL
COMMUNITY
Start: 2021-09-01 | End: 2021-10-21 | Stop reason: CLARIF
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
## 2021-10-22 ENCOUNTER — TELEPHONE (OUTPATIENT)
Dept: OBGYN CLINIC | Age: 26
End: 2021-10-22

## 2021-10-26 ENCOUNTER — ROUTINE PRENATAL (OUTPATIENT)
Dept: OBGYN CLINIC | Age: 26
End: 2021-10-26
Payer: COMMERCIAL

## 2021-10-26 ENCOUNTER — HOSPITAL ENCOUNTER (OUTPATIENT)
Age: 26
Setting detail: SPECIMEN
Discharge: HOME | End: 2021-10-26
Payer: COMMERCIAL

## 2021-10-26 DIAGNOSIS — O09.93 HIGH-RISK PREGNANCY IN THIRD TRIMESTER: Primary | ICD-10-CM

## 2021-10-26 DIAGNOSIS — Z3A.36 36 WEEKS GESTATION OF PREGNANCY: ICD-10-CM

## 2021-10-26 PROCEDURE — G8484 FLU IMMUNIZE NO ADMIN: HCPCS | Performed by: OBSTETRICS & GYNECOLOGY

## 2021-10-26 PROCEDURE — G8419 CALC BMI OUT NRM PARAM NOF/U: HCPCS | Performed by: OBSTETRICS & GYNECOLOGY

## 2021-10-26 PROCEDURE — 99213 OFFICE O/P EST LOW 20 MIN: CPT | Performed by: OBSTETRICS & GYNECOLOGY

## 2021-10-26 PROCEDURE — G8427 DOCREV CUR MEDS BY ELIG CLIN: HCPCS | Performed by: OBSTETRICS & GYNECOLOGY

## 2021-10-26 PROCEDURE — 1036F TOBACCO NON-USER: CPT | Performed by: OBSTETRICS & GYNECOLOGY

## 2021-10-26 RX ORDER — FLUCONAZOLE 150 MG/1
150 TABLET ORAL ONCE
Qty: 2 TABLET | Refills: 0 | Status: SHIPPED | OUTPATIENT
Start: 2021-10-26 | End: 2021-10-26

## 2021-10-27 VITALS
WEIGHT: 172 LBS | HEART RATE: 67 BPM | DIASTOLIC BLOOD PRESSURE: 70 MMHG | BODY MASS INDEX: 32.5 KG/M2 | SYSTOLIC BLOOD PRESSURE: 104 MMHG

## 2021-10-27 DIAGNOSIS — O09.93 HIGH-RISK PREGNANCY IN THIRD TRIMESTER: ICD-10-CM

## 2021-10-27 DIAGNOSIS — Z3A.36 36 WEEKS GESTATION OF PREGNANCY: ICD-10-CM
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
## 2021-10-30 ENCOUNTER — HOSPITAL ENCOUNTER (OUTPATIENT)
Age: 26
Setting detail: OBSERVATION
Discharge: HOME | End: 2021-10-30
Attending: OBSTETRICS & GYNECOLOGY | Admitting: OBSTETRICS & GYNECOLOGY
Payer: COMMERCIAL

## 2021-10-30 VITALS
DIASTOLIC BLOOD PRESSURE: 59 MMHG | HEART RATE: 68 BPM | TEMPERATURE: 97.2 F | RESPIRATION RATE: 16 BRPM | SYSTOLIC BLOOD PRESSURE: 117 MMHG | OXYGEN SATURATION: 96 %

## 2021-10-30 PROBLEM — O26.899 CRAMPING AFFECTING PREGNANCY, ANTEPARTUM: Status: ACTIVE | Noted: 2021-10-30

## 2021-10-30 PROBLEM — R10.9 CRAMPING AFFECTING PREGNANCY, ANTEPARTUM: Status: ACTIVE | Noted: 2021-10-30

## 2021-10-30 LAB
BILIRUBIN URINE: NEGATIVE
COLOR: YELLOW
COMMENT UA: NORMAL
CULTURE: NORMAL
GLUCOSE URINE: NEGATIVE
KETONES, URINE: NEGATIVE
LEUKOCYTE ESTERASE, URINE: NEGATIVE
Lab: NORMAL
NITRITE, URINE: NEGATIVE
PH UA: 6.5 (ref 5–8)
PROTEIN UA: NEGATIVE
SPECIFIC GRAVITY UA: 1.03 (ref 1–1.03)
SPECIMEN DESCRIPTION: NORMAL
TURBIDITY: CLEAR
URINE HGB: NEGATIVE
UROBILINOGEN, URINE: NORMAL

## 2021-10-30 PROCEDURE — 81003 URINALYSIS AUTO W/O SCOPE: CPT

## 2021-10-30 PROCEDURE — 99213 OFFICE O/P EST LOW 20 MIN: CPT

## 2021-10-30 NOTE — FLOWSHEET NOTE
Patient arrives on unit from home, states that she has been cramping since this morning and vomiting as well as pressure. Denies any gush of fluid and denies bleeding. States that she has had intercourse within the last 24 hours.
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
## 2021-10-30 NOTE — FLOWSHEET NOTE
Dr. Skyler Little called and notified of patient's arrival and complaints. Notified of SVE 1/40%/-2, denies gush of fluid, denies bleeding states that she has had intercourse within the last 24 hours. Irritability noted on TOCO. Nitrazine tape negative.  Urine sent    Dr. Skyler Little gives ok for discharge per telephone

## 2021-11-02 ENCOUNTER — ROUTINE PRENATAL (OUTPATIENT)
Dept: OBGYN CLINIC | Age: 26
End: 2021-11-02
Payer: COMMERCIAL

## 2021-11-02 VITALS
SYSTOLIC BLOOD PRESSURE: 106 MMHG | WEIGHT: 175 LBS | HEART RATE: 98 BPM | DIASTOLIC BLOOD PRESSURE: 62 MMHG | BODY MASS INDEX: 33.07 KG/M2

## 2021-11-02 DIAGNOSIS — Z3A.37 37 WEEKS GESTATION OF PREGNANCY: ICD-10-CM

## 2021-11-02 DIAGNOSIS — O09.93 HIGH-RISK PREGNANCY IN THIRD TRIMESTER: Primary | ICD-10-CM

## 2021-11-02 PROCEDURE — 1036F TOBACCO NON-USER: CPT | Performed by: NURSE PRACTITIONER

## 2021-11-02 PROCEDURE — 99213 OFFICE O/P EST LOW 20 MIN: CPT | Performed by: NURSE PRACTITIONER

## 2021-11-02 PROCEDURE — G8427 DOCREV CUR MEDS BY ELIG CLIN: HCPCS | Performed by: NURSE PRACTITIONER

## 2021-11-02 PROCEDURE — G8419 CALC BMI OUT NRM PARAM NOF/U: HCPCS | Performed by: NURSE PRACTITIONER

## 2021-11-02 PROCEDURE — G8484 FLU IMMUNIZE NO ADMIN: HCPCS | Performed by: NURSE PRACTITIONER
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
## 2021-11-04 ENCOUNTER — ROUTINE PRENATAL (OUTPATIENT)
Dept: PERINATAL CARE | Age: 26
End: 2021-11-04
Payer: COMMERCIAL

## 2021-11-04 VITALS
SYSTOLIC BLOOD PRESSURE: 108 MMHG | WEIGHT: 175 LBS | HEIGHT: 61 IN | BODY MASS INDEX: 33.04 KG/M2 | RESPIRATION RATE: 16 BRPM | TEMPERATURE: 97.3 F | DIASTOLIC BLOOD PRESSURE: 60 MMHG | HEART RATE: 70 BPM

## 2021-11-04 DIAGNOSIS — Z3A.37 37 WEEKS GESTATION OF PREGNANCY: ICD-10-CM

## 2021-11-04 DIAGNOSIS — O99.891 CURRENT MATERNAL CONDITION AFFECTING PREGNANCY: ICD-10-CM

## 2021-11-04 DIAGNOSIS — O09.33 INSUFFICIENT PRENATAL CARE IN THIRD TRIMESTER: ICD-10-CM

## 2021-11-04 DIAGNOSIS — O99.330 TOBACCO USE DISORDER COMPLICATING PREGNANCY, CHILDBIRTH, OR PUERPERIUM, ANTEPARTUM: ICD-10-CM

## 2021-11-04 DIAGNOSIS — O99.213 OBESITY AFFECTING PREGNANCY IN THIRD TRIMESTER: ICD-10-CM

## 2021-11-04 DIAGNOSIS — O35.8XX0 SUSPECTED DAMAGE TO FETUS FROM DISEASE IN MOTHER, ANTEPARTUM CONDITION, SINGLE OR UNSPECIFIED FETUS: Primary | ICD-10-CM

## 2021-11-04 DIAGNOSIS — O09.899 SHORT INTERVAL BETWEEN PREGNANCIES COMPLICATING PREGNANCY, ANTEPARTUM: ICD-10-CM

## 2021-11-04 LAB
ABDOMINAL CIRCUMFERENCE: NORMAL
BIPARIETAL DIAMETER: NORMAL
ESTIMATED FETAL WEIGHT: NORMAL
FEMORAL DIAMETER: NORMAL
HC/AC: NORMAL
HEAD CIRCUMFERENCE: NORMAL

## 2021-11-04 PROCEDURE — 76811 OB US DETAILED SNGL FETUS: CPT | Performed by: OBSTETRICS & GYNECOLOGY

## 2021-11-04 PROCEDURE — 76819 FETAL BIOPHYS PROFIL W/O NST: CPT | Performed by: OBSTETRICS & GYNECOLOGY

## 2021-11-11 ENCOUNTER — ROUTINE PRENATAL (OUTPATIENT)
Dept: OBGYN CLINIC | Age: 26
End: 2021-11-11
Payer: COMMERCIAL

## 2021-11-11 VITALS
DIASTOLIC BLOOD PRESSURE: 68 MMHG | SYSTOLIC BLOOD PRESSURE: 114 MMHG | HEART RATE: 78 BPM | BODY MASS INDEX: 33.44 KG/M2 | WEIGHT: 177 LBS

## 2021-11-11 DIAGNOSIS — Z3A.38 38 WEEKS GESTATION OF PREGNANCY: Primary | ICD-10-CM

## 2021-11-11 PROCEDURE — 1036F TOBACCO NON-USER: CPT | Performed by: OBSTETRICS & GYNECOLOGY

## 2021-11-11 PROCEDURE — 99213 OFFICE O/P EST LOW 20 MIN: CPT | Performed by: OBSTETRICS & GYNECOLOGY

## 2021-11-11 PROCEDURE — G8427 DOCREV CUR MEDS BY ELIG CLIN: HCPCS | Performed by: OBSTETRICS & GYNECOLOGY

## 2021-11-11 PROCEDURE — G8484 FLU IMMUNIZE NO ADMIN: HCPCS | Performed by: OBSTETRICS & GYNECOLOGY

## 2021-11-11 PROCEDURE — G8419 CALC BMI OUT NRM PARAM NOF/U: HCPCS | Performed by: OBSTETRICS & GYNECOLOGY

## 2021-11-11 NOTE — PROGRESS NOTES
Kermit Mack is a 31 y.o. female 38w2d        OB History    Para Term  AB Living   3 2 2     2   SAB IAB Ectopic Molar Multiple Live Births           0 2      # Outcome Date GA Lbr Francisco J/2nd Weight Sex Delivery Anes PTL Lv   3 Current            2 Term 20 39w0d 00:43 / 00:09 6 lb 11.5 oz (3.048 kg) M Vag-Spont EPI N JACKSON   1 Term 2012 40w0d  6 lb 5 oz (2.863 kg) F    JACKSON       Vitals  BP: 114/68  Weight: 177 lb (80.3 kg)  Pulse: 78  Patient Position: Sitting      The patient was seen and evaluated. There was positive fetal movements. No contractions or leakage of fluid. Signs and symptoms of labor were reviewed. The S/S of Pre-Eclampsia were reviewed with the patient in detail. She is to report any of these if they occur. She currently denies any of these. The patient was instructed on fetal kick counts and was given a kick sheet to complete every 8 hours. She was instructed that the baby should move at a minimum of ten times within one hour after a meal. The patient was instructed to lay down on her left side twenty minutes after eating and count movements for up to one hour with a target value of ten movements. She was instructed to notify the office if she did not make that target after two attempts or if after any attempt there was less than four movements. The patient reports that the targets have been made Yes. 10/21/21 PRAF form completed       T-Dap Vaccine Completed (27-36 weeks): No    Allergies: Allergies as of 2021 - Fully Reviewed 2021   Allergen Reaction Noted    Sulfamethoxazole-trimethoprim Hives and Rash 2017         Group Beta Strep collection was completed.  Yes  GBS Results:   Admission on 10/30/2021, Discharged on 10/30/2021   Component Date Value Ref Range Status    Color, UA 10/30/2021 Yellow  Yellow Final    Turbidity UA 10/30/2021 Clear  Clear Final    Glucose, Ur 10/30/2021 NEGATIVE  NEGATIVE Final    Bilirubin Urine 10/30/2021 NEGATIVE  NEGATIVE Final    Ketones, Urine 10/30/2021 NEGATIVE  NEGATIVE Final    Specific [place], UA 10/30/2021 1.028  1.000 - 1.030 Final    Urine Hgb 10/30/2021 NEGATIVE  NEGATIVE Final    pH, UA 10/30/2021 6.5  5.0 - 8.0 Final    Protein, UA 10/30/2021 NEGATIVE  NEGATIVE Final    Urobilinogen, Urine 10/30/2021 Normal  Normal Final    Nitrite, Urine 10/30/2021 NEGATIVE  NEGATIVE Final    Leukocyte Esterase, Urine 10/30/2021 NEGATIVE  NEGATIVE Final    Urinalysis Comments 10/30/2021 Microscopic exam not performed based on chemical results unless requested in original order. Final   Hospital Outpatient Visit on 10/26/2021   Component Date Value Ref Range Status    Specimen Description 10/27/2021 . VAGINA   Final    Special Requests 10/27/2021 NOT REPORTED   Final    Culture 10/27/2021 NORMAL URO-GENITAL EDWIN   Final    Culture 10/27/2021 NEGATIVE FOR NEISSERIA GONORRHOEAE   Final    Culture 10/27/2021 NEGATIVE FOR GROUP B STREPTOCOCCI   Final   ]        Cervical Exam was:   1-2/ 70%/-1/V/I cm dilated    The literature regarding a questionable link to pitocin augmentation and induction of labor, the assistance of labor contractions and the initiation of contractions to help delivery, have been reviewed with the patient regarding the increased potential of having a  with Attention Deficit Hyperactivity Disorder and or Autism. These two disorders and the ramifications of their impact on a child and the family caring for that child has been reviewed with the patient in detail. She was given the risks, benefits and alternatives of the use of this medication. She has agreed to its use in the delivery of her unborn child if needed at the time of delivery, Yes. The patient was counseled on the mandatory call ahead policy. She has been instructed to call the office at anytime prior to going into the hospital so the on-call physician may direct her to the appropriate facility for care. Exceptions were reviewed including but not limited to: Decreased fetal movement, vaginal Bleeding or hemorrhage, trauma, readily expectant delivery, or any instance where she feels 911 should be utilized. The patient was counseled on Labor & Delivery. Route of delivery and counseling on vaginal, operative vaginal, and  sections were completed with the risks of each to both the patient as well as her baby. The possibility of a blood transfusion was discussed as well. The patient was not opposed to receiving a transfusion if needed. The patient was counseled on types of analgesia during labor and is considering either Regional or IV medication the risks, benefits and alternatives were discussed.  Testing:  Not indicated  The recommendation to proceed to fetal kick counts every 8 hours daily instead of Non stress testing, (as per Darshan SUN, JANEE Rodriguez guidance for Covid-19 testing, American Journal of Obstetrics & Gynecology, 1916)        Assessment:  1Jackelin Blackman is a 31 y.o. female  2.  [id number]  3. 38w2d    Patient Active Problem List    Diagnosis Date Noted    H/O splenectomy 2019     Priority: High     Overview Note:     Due to Trauma        Cramping affecting pregnancy, antepartum 10/30/2021    High-risk pregnancy 10/21/2021    Anemia affecting pregnancy in third trimester 10/21/2021    PTSD (post-traumatic stress disorder) 2021    Anxiety during pregnancy, antepartum 2021    Asplenia after surgical procedure 2021    History of gestational diabetes in prior pregnancy, currently pregnant 2021    Obesity affecting pregnancy 2021     3/19/20 M APG 8/9 Wt 6#7 2020    HRP (high risk pregnancy), third trimester 2020    History of blood transfusion 2020     Overview Note:     After trauma      Tetrahydrocannabinol (THC) use disorder, mild, abuse 2020    Obesity 2019    History of cryosurgery of cervix affecting pregnancy 2019     Overview Note:     2017 hx of cryosurgery in DR. Lebron's office in [facility] HEART Depression     Nicotine dependence, cigarettes, uncomplicated         Diagnosis Orders   1. 38 weeks gestation of pregnancy             Plan:  The patient will return to the office for her next visit in 2-3 weeks for postpartum visit. See antepartum flow sheet. Pt wishes IOL 39 weeks. Pt scheduled at 6 am 2021. Pt counseled risks/ benefits    Medical Induction of Labor     Definition   In some cases, the doctor needs to help labor begin by using:   Medicine   Other procedures   This is done instead of waiting for your body to go into labor on its own. In the [facility], nearly one in five labors is induced. Reasons for Procedure   The most common reason to have an induction is that the pregnancy has gone two or more weeks past the due date. In this situation, the baby may:   Get too large for a vaginal delivery   Not be able to receive enough oxygen through the placenta (the organ that links the mother and the baby)   Other reasons for induction include:   Water breaks and contractions do not begin   High blood pressure or diabetes in the mother   Infection in the uterus   The baby is not growing properly   Low amniotic fluid level   Possible Complications   The same complications that may occur from a spontaneous delivery also apply to an induced delivery, as well as the following risks:   Stalled labor--If the medicine does not trigger labor, you may need a  section (). Strong contractions--The medicine that causes contractions could make them too strong. Although rare, this can lead to fetal distress and uterine rupture. In the event that your contractions are too strong, your doctor will lower the dose or stop the medicine. Be sure to discuss these risks with your doctor before the procedure.    What to Expect   Prior to Procedure   While the same instructions apply for an induced labor as for a spontaneous birth, there are some differences. Do not eat too much before arriving at the hospital. (It is okay to have clear fluids, though.) The medicines can create very strong contractions and could upset your stomach. Contractions slow the digestive process, so your stomach will remain full. This can cause a problem if you need general anesthesia . Description of the Procedure Cervical Ripening   To deliver your baby vaginally, the cervix will need to [address] 8.  This means it needs to soften, thin, and open to prepare for delivery. If your cervix is not doing this already, your doctor may aid this process by giving you medicine, which may be a:   Gel that is applied to the cervix   Suppository put in the vagina   Pill taken by mouth   The cervical ripening process can last for up to a few days. There are also procedures that your doctor may try to aid cervical ripening, such as:   Strip the membranes (separate your cervix from the tissues around the baby's head)   Expand a small balloon-tipped catheter in the uterus   Place small cylinders that contain a type of sponge-like seaweed into the uterus     Changes in the Cervix During Pregnancy       © 2011 [address] [place] [address].   Contractions   If contractions have not started once your cervix is ripe, your doctor will give you a drug that causes contractions. The drug is a man-made version of a hormone called oxytocin. This hormone is produced by your body during active labor. The drug will be adjusted during labor to strengthen or weaken the contractions. Once contractions begin, the labor and birth process will be the same as a spontaneous delivery.    Anesthesia   The same pain medicines are available for an induced labor as for a spontaneous delivery, including:   Pain medicine given into your vein   Epidural block   Spinal block   Local anesthesia   Immediately After Procedure If everything goes well, after the induction you will vaginally deliver a healthy baby. How Long Will It Take? It can be hours to several days (very rarely) from the time you are induced until the delivery. If your cervix is not ripe when you are scheduled for the induction, labor and delivery could take 2-3 days. It could take longer for first-time mothers and for pre-term babies. How Much Will It Hurt? Labor causes severe pain. Talk to your doctor about ways to manage the pain. [address] Stay   The usual length of stay is 1-3 days. Your doctor may choose to keep you longer if you have any problems. Postoperative Care   The care after an induced labor is the same as for a spontaneous birth. Call Your Doctor   When you go home after having a vaginal delivery, call your doctor if any of the following occurs: An unexplained fever of 100.4°F (38°C) or above in the first two weeks   Soaking more than one sanitary napkin an hour or if the bleeding level increases   Wounds that become red, swollen, or drain pus   New pain, swelling, or tenderness in your legs   Hot-to-the-touch, significantly reddened, sore breasts   Any cracking or bleeding from the nipple or areola (the dark-colored area of the breast)   Foul-smelling vaginal discharge   Painful urination or a sudden urge to urinate, inability to control urination   Increasing pain in the vaginal area   Cough or chest pain, nausea, or vomiting   Depression, hallucinations, suicidal thoughts, or any thoughts of harming your baby   In case of an emergency, CALL 911 . Patient was seen with total face to face time of 20 minutes. More than 50% of this visit was on counseling and education regarding her    Diagnosis Orders   1. 38 weeks gestation of pregnancy      and her options. She was also counseled on her preventative health maintenance recommendations and follow-up.

## 2021-11-16 ENCOUNTER — ANESTHESIA (OUTPATIENT)
Dept: LABOR AND DELIVERY | Age: 26
DRG: 560 | End: 2021-11-16
Payer: COMMERCIAL

## 2021-11-16 ENCOUNTER — HOSPITAL ENCOUNTER (INPATIENT)
Age: 26
LOS: 1 days | Discharge: HOME | DRG: 560 | End: 2021-11-17
Attending: OBSTETRICS & GYNECOLOGY | Admitting: OBSTETRICS & GYNECOLOGY
Payer: COMMERCIAL

## 2021-11-16 ENCOUNTER — ANESTHESIA EVENT (OUTPATIENT)
Dept: LABOR AND DELIVERY | Age: 26
DRG: 560 | End: 2021-11-16
Payer: COMMERCIAL

## 2021-11-16 PROBLEM — Z34.90 ENCOUNTER FOR INDUCTION OF LABOR: Status: ACTIVE | Noted: 2021-11-16

## 2021-11-16 LAB
ABO/RH: NORMAL
AMPHETAMINE SCREEN URINE: NEGATIVE
ANTIBODY SCREEN: NEGATIVE
ARM BAND NUMBER: NORMAL
BARBITURATE SCREEN URINE: NEGATIVE
BENZODIAZEPINE SCREEN, URINE: NEGATIVE
BUPRENORPHINE URINE: NORMAL
CANNABINOID SCREEN URINE: NEGATIVE
COCAINE METABOLITE, URINE: NEGATIVE
ESTIMATED AVERAGE GLUCOSE: 123 MG/DL
EXPIRATION DATE: NORMAL
GLUCOSE BLD-MCNC: 111 MG/DL (ref 65–105)
HBA1C MFR BLD: 5.9 % (ref 4–6)
HCT VFR BLD CALC: 33.2 % (ref 36–46)
HEMOGLOBIN: 10.9 G/DL (ref 12–16)
MCH RBC QN AUTO: 27.4 PG (ref 26–34)
MCHC RBC AUTO-ENTMCNC: 32.9 G/DL (ref 31–37)
MCV RBC AUTO: 83.4 FL (ref 80–100)
MDMA URINE: NORMAL
METHADONE SCREEN, URINE: NEGATIVE
METHAMPHETAMINE, URINE: NORMAL
NRBC AUTOMATED: ABNORMAL PER 100 WBC
OPIATES, URINE: NEGATIVE
OXYCODONE SCREEN URINE: NEGATIVE
PDW BLD-RTO: 13.3 % (ref 11.5–14.9)
PHENCYCLIDINE, URINE: NEGATIVE
PLATELET # BLD: 267 K/UL (ref 150–450)
PMV BLD AUTO: 12.4 FL (ref 6–12)
PROPOXYPHENE, URINE: NORMAL
RBC # BLD: 3.98 M/UL (ref 4–5.2)
SARS-COV-2, RAPID: NOT DETECTED
SPECIMEN DESCRIPTION: NORMAL
T. PALLIDUM, IGG: NONREACTIVE
TEST INFORMATION: NORMAL
TRICYCLIC ANTIDEPRESSANTS, UR: NORMAL
WBC # BLD: 15 K/UL (ref 3.5–11)

## 2021-11-16 PROCEDURE — 7200000001 HC VAGINAL DELIVERY

## 2021-11-16 PROCEDURE — 86850 RBC ANTIBODY SCREEN: CPT

## 2021-11-16 PROCEDURE — 82947 ASSAY GLUCOSE BLOOD QUANT: CPT

## 2021-11-16 PROCEDURE — 1220000000 HC SEMI PRIVATE OB R&B

## 2021-11-16 PROCEDURE — 59409 OBSTETRICAL CARE: CPT | Performed by: OBSTETRICS & GYNECOLOGY

## 2021-11-16 PROCEDURE — 86901 BLOOD TYPING SEROLOGIC RH(D): CPT

## 2021-11-16 PROCEDURE — 88307 TISSUE EXAM BY PATHOLOGIST: CPT

## 2021-11-16 PROCEDURE — 3E033VJ INTRODUCTION OF OTHER HORMONE INTO PERIPHERAL VEIN, PERCUTANEOUS APPROACH: ICD-10-PCS | Performed by: OBSTETRICS & GYNECOLOGY

## 2021-11-16 PROCEDURE — 36415 COLL VENOUS BLD VENIPUNCTURE: CPT

## 2021-11-16 PROCEDURE — 2500000003 HC RX 250 WO HCPCS: Performed by: ANESTHESIOLOGY

## 2021-11-16 PROCEDURE — 87635 SARS-COV-2 COVID-19 AMP PRB: CPT

## 2021-11-16 PROCEDURE — 85027 COMPLETE CBC AUTOMATED: CPT

## 2021-11-16 PROCEDURE — 6370000000 HC RX 637 (ALT 250 FOR IP): Performed by: ADVANCED PRACTICE MIDWIFE

## 2021-11-16 PROCEDURE — 83036 HEMOGLOBIN GLYCOSYLATED A1C: CPT

## 2021-11-16 PROCEDURE — 86900 BLOOD TYPING SEROLOGIC ABO: CPT

## 2021-11-16 PROCEDURE — 86780 TREPONEMA PALLIDUM: CPT

## 2021-11-16 PROCEDURE — 62327 NJX INTERLAMINAR LMBR/SAC: CPT | Performed by: ANESTHESIOLOGY

## 2021-11-16 PROCEDURE — 6360000002 HC RX W HCPCS: Performed by: ADVANCED PRACTICE MIDWIFE

## 2021-11-16 PROCEDURE — 2580000003 HC RX 258: Performed by: ADVANCED PRACTICE MIDWIFE

## 2021-11-16 PROCEDURE — 80307 DRUG TEST PRSMV CHEM ANLYZR: CPT

## 2021-11-16 RX ORDER — SODIUM CHLORIDE 0.9 % (FLUSH) 0.9 %
5-40 SYRINGE (ML) INJECTION EVERY 12 HOURS SCHEDULED
Status: DISCONTINUED | OUTPATIENT
Start: 2021-11-16 | End: 2021-11-17 | Stop reason: HOSPADM

## 2021-11-16 RX ORDER — SODIUM CHLORIDE, SODIUM LACTATE, POTASSIUM CHLORIDE, AND CALCIUM CHLORIDE .6; .31; .03; .02 G/100ML; G/100ML; G/100ML; G/100ML
500 INJECTION, SOLUTION INTRAVENOUS PRN
Status: DISCONTINUED | OUTPATIENT
Start: 2021-11-16 | End: 2021-11-16

## 2021-11-16 RX ORDER — IBUPROFEN 800 MG/1
800 TABLET ORAL EVERY 6 HOURS PRN
Status: DISCONTINUED | OUTPATIENT
Start: 2021-11-16 | End: 2021-11-17 | Stop reason: HOSPADM

## 2021-11-16 RX ORDER — LANOLIN 100 %
OINTMENT (GRAM) TOPICAL PRN
Status: DISCONTINUED | OUTPATIENT
Start: 2021-11-16 | End: 2021-11-17 | Stop reason: HOSPADM

## 2021-11-16 RX ORDER — ONDANSETRON 2 MG/ML
4 INJECTION INTRAMUSCULAR; INTRAVENOUS EVERY 6 HOURS PRN
Status: DISCONTINUED | OUTPATIENT
Start: 2021-11-16 | End: 2021-11-16

## 2021-11-16 RX ORDER — SODIUM CHLORIDE, SODIUM LACTATE, POTASSIUM CHLORIDE, CALCIUM CHLORIDE 600; 310; 30; 20 MG/100ML; MG/100ML; MG/100ML; MG/100ML
INJECTION, SOLUTION INTRAVENOUS CONTINUOUS
Status: DISCONTINUED | OUTPATIENT
Start: 2021-11-16 | End: 2021-11-16

## 2021-11-16 RX ORDER — NALBUPHINE HCL 10 MG/ML
5 AMPUL (ML) INJECTION
Status: DISCONTINUED | OUTPATIENT
Start: 2021-11-16 | End: 2021-11-16

## 2021-11-16 RX ORDER — DOCUSATE SODIUM 100 MG/1
100 CAPSULE, LIQUID FILLED ORAL 2 TIMES DAILY
Status: DISCONTINUED | OUTPATIENT
Start: 2021-11-16 | End: 2021-11-17 | Stop reason: HOSPADM

## 2021-11-16 RX ORDER — SODIUM CHLORIDE, SODIUM LACTATE, POTASSIUM CHLORIDE, AND CALCIUM CHLORIDE .6; .31; .03; .02 G/100ML; G/100ML; G/100ML; G/100ML
1000 INJECTION, SOLUTION INTRAVENOUS PRN
Status: DISCONTINUED | OUTPATIENT
Start: 2021-11-16 | End: 2021-11-16

## 2021-11-16 RX ORDER — NALOXONE HYDROCHLORIDE 0.4 MG/ML
0.4 INJECTION, SOLUTION INTRAMUSCULAR; INTRAVENOUS; SUBCUTANEOUS PRN
Status: DISCONTINUED | OUTPATIENT
Start: 2021-11-16 | End: 2021-11-16

## 2021-11-16 RX ORDER — SODIUM CHLORIDE 9 MG/ML
25 INJECTION, SOLUTION INTRAVENOUS PRN
Status: DISCONTINUED | OUTPATIENT
Start: 2021-11-16 | End: 2021-11-17 | Stop reason: HOSPADM

## 2021-11-16 RX ORDER — ACETAMINOPHEN 325 MG/1
650 TABLET ORAL EVERY 6 HOURS PRN
Status: DISCONTINUED | OUTPATIENT
Start: 2021-11-16 | End: 2021-11-17 | Stop reason: HOSPADM

## 2021-11-16 RX ORDER — SODIUM CHLORIDE 0.9 % (FLUSH) 0.9 %
5-40 SYRINGE (ML) INJECTION PRN
Status: DISCONTINUED | OUTPATIENT
Start: 2021-11-16 | End: 2021-11-16

## 2021-11-16 RX ORDER — SODIUM CHLORIDE 0.9 % (FLUSH) 0.9 %
5-40 SYRINGE (ML) INJECTION PRN
Status: DISCONTINUED | OUTPATIENT
Start: 2021-11-16 | End: 2021-11-17 | Stop reason: HOSPADM

## 2021-11-16 RX ORDER — SODIUM CHLORIDE 0.9 % (FLUSH) 0.9 %
5-40 SYRINGE (ML) INJECTION EVERY 12 HOURS SCHEDULED
Status: DISCONTINUED | OUTPATIENT
Start: 2021-11-16 | End: 2021-11-16

## 2021-11-16 RX ORDER — LIDOCAINE HYDROCHLORIDE AND EPINEPHRINE 15; 5 MG/ML; UG/ML
INJECTION, SOLUTION EPIDURAL PRN
Status: DISCONTINUED | OUTPATIENT
Start: 2021-11-16 | End: 2021-11-16 | Stop reason: SDUPTHER

## 2021-11-16 RX ORDER — ACETAMINOPHEN 325 MG/1
650 TABLET ORAL EVERY 4 HOURS PRN
Status: DISCONTINUED | OUTPATIENT
Start: 2021-11-16 | End: 2021-11-16

## 2021-11-16 RX ORDER — SODIUM CHLORIDE, SODIUM LACTATE, POTASSIUM CHLORIDE, CALCIUM CHLORIDE 600; 310; 30; 20 MG/100ML; MG/100ML; MG/100ML; MG/100ML
INJECTION, SOLUTION INTRAVENOUS CONTINUOUS
Status: DISCONTINUED | OUTPATIENT
Start: 2021-11-16 | End: 2021-11-17

## 2021-11-16 RX ORDER — SODIUM CHLORIDE 9 MG/ML
25 INJECTION, SOLUTION INTRAVENOUS PRN
Status: DISCONTINUED | OUTPATIENT
Start: 2021-11-16 | End: 2021-11-16

## 2021-11-16 RX ADMIN — ACETAMINOPHEN 650 MG: 325 TABLET ORAL at 07:43

## 2021-11-16 RX ADMIN — Medication 8 ML/HR: at 13:10

## 2021-11-16 RX ADMIN — SODIUM CHLORIDE, POTASSIUM CHLORIDE, SODIUM LACTATE AND CALCIUM CHLORIDE: 600; 310; 30; 20 INJECTION, SOLUTION INTRAVENOUS at 07:20

## 2021-11-16 RX ADMIN — Medication 1 MILLI-UNITS/MIN: at 07:43

## 2021-11-16 RX ADMIN — Medication 87.3 MILLI-UNITS/MIN: at 16:21

## 2021-11-16 RX ADMIN — LIDOCAINE HYDROCHLORIDE,EPINEPHRINE BITARTRATE 3 ML: 15; .005 INJECTION, SOLUTION EPIDURAL; INFILTRATION; INTRACAUDAL; PERINEURAL at 13:15

## 2021-11-16 RX ADMIN — IBUPROFEN 800 MG: 800 TABLET, FILM COATED ORAL at 22:39

## 2021-11-16 RX ADMIN — LIDOCAINE HYDROCHLORIDE,EPINEPHRINE BITARTRATE 2 ML: 15; .005 INJECTION, SOLUTION EPIDURAL; INFILTRATION; INTRACAUDAL; PERINEURAL at 13:19

## 2021-11-16 RX ADMIN — SODIUM CHLORIDE, PRESERVATIVE FREE 10 ML: 5 INJECTION INTRAVENOUS at 20:37

## 2021-11-16 RX ADMIN — IBUPROFEN 800 MG: 800 TABLET, FILM COATED ORAL at 16:42

## 2021-11-16 RX ADMIN — SODIUM CHLORIDE, POTASSIUM CHLORIDE, SODIUM LACTATE AND CALCIUM CHLORIDE: 600; 310; 30; 20 INJECTION, SOLUTION INTRAVENOUS at 16:22

## 2021-11-16 RX ADMIN — SODIUM CHLORIDE, POTASSIUM CHLORIDE, SODIUM LACTATE AND CALCIUM CHLORIDE: 600; 310; 30; 20 INJECTION, SOLUTION INTRAVENOUS at 13:10

## 2021-11-16 RX ADMIN — DOCUSATE SODIUM 100 MG: 100 CAPSULE, LIQUID FILLED ORAL at 20:37

## 2021-11-16 ASSESSMENT — PAIN DESCRIPTION - LOCATION: LOCATION: ABDOMEN

## 2021-11-16 ASSESSMENT — PAIN DESCRIPTION - DESCRIPTORS: DESCRIPTORS: CRAMPING

## 2021-11-16 ASSESSMENT — PAIN DESCRIPTION - PAIN TYPE: TYPE: ACUTE PAIN

## 2021-11-16 ASSESSMENT — PAIN SCALES - GENERAL
PAINLEVEL_OUTOF10: 3
PAINLEVEL_OUTOF10: 4
PAINLEVEL_OUTOF10: 0
PAINLEVEL_OUTOF10: 3

## 2021-11-16 ASSESSMENT — PAIN DESCRIPTION - ORIENTATION: ORIENTATION: LOWER;MID

## 2021-11-16 NOTE — PROGRESS NOTES
Patient Name: Juan Antonio York  Patient : 1995  Room/Bed: 3018/7009-08  Admission Date/Time: 2021  6:20 AM  MRN #: [id number]  [facility] #: [id number]    Date: 2021  Time: 1:46 PM        Juan Antonio York is a 31 y.o. female  OB History    Para Term  AB Living   3 2 2 0 0 2   SAB IAB Ectopic Molar Multiple Live Births   0 0 0 0 0 2      # Outcome Date GA Lbr Francisco J/2nd Weight Sex Delivery Anes PTL Lv   3 Current            2 Term 20 39w0d 00:43 / 00:09 6 lb 11.5 oz (3.048 kg) M Vag-Spont EPI N JACKSON      Name: Lore Hernandez: Alycia  Apgar5: 9   1 Term 2012 40w0d  6 lb 5 oz (2.863 kg) F    JACKSON       Gestational Age:  39w0d      The patient was seen and examined. The details of her pelvic exam can be found in the EPIC flow section of her EMR. Her pain  is well controlled by epidural.  The baby is moving well. Tracing Review (Date of Tracing): There Is moderate Variability  Vitals:    21 1327 21 1332 21 1337 21 1342   BP:       Pulse:       Resp:       Temp:       TempSrc:       SpO2: 100% 100% 100% 98%   Weight:       Height:         FHT's are 130  The tracing is Category 1 . Intervention:  None      Membranes Are: Ruptured clear fluid- SROM at time of exam  Scalp Electrode in place: No  Intrauterine Pressure Catheter in Place: No      4 Week Lab Review:  Admission on 2021   Component Date Value Ref Range Status    Specimen Description 2021 . NASOPHARYNGEAL SWAB   Final    SARS-CoV-2, Rapid 2021 Not Detected  Not Detected Final    Comment:       Rapid NAAT:  The specimen is NEGATIVE for SARS-CoV-2, the novel coronavirus associated with   COVID-19. The ID NOW COVID-19 assay is designed to detect the virus that causes COVID-19 in patients   with signs and symptoms of infection who are suspected of COVID-19.   An individual without symptoms of COVID-19 and who is not shedding SARS-CoV-2 virus would   expect to  Methamphetamine, Urine 11/16/2021 NOT REPORTED  NEGATIVE Final    Tricyclic Antidepressants, Urine 11/16/2021 NOT REPORTED  NEGATIVE Final    MDMA, Urine 11/16/2021 NOT REPORTED  NEGATIVE Final    Buprenorphine Urine 11/16/2021 NOT REPORTED  NEGATIVE Final    Test Information 11/16/2021 Assay provides medical screening only. The absence of expected drug(s) and/or metabolite(s) may indicate diluted or adulterated urine, limitations of testing or timing of collection. Final    Comment: Testing for legal purposes should be confirmed by another method. To request confirmation   of test result, please call the lab within 7 days of sample submission.  T. pallidum, IgG 11/16/2021 NONREACTIVE  NONREACTIVE Final    Comment:       T. pallidum antibodies are not detected. There is no serological evidence of infection with T. pallidum (early primary syphilis   cannot be excluded). Retest in 2-4 weeks if syphilis is clinically suspect.  WBC 11/16/2021 15.0* 3.5 - 11.0 k/uL Final    RBC 11/16/2021 3.98* 4.0 - 5.2 m/uL Final    Hemoglobin 11/16/2021 10.9* 12.0 - 16.0 g/dL Final    Hematocrit 11/16/2021 33.2* 36 - 46 % Final    MCV 11/16/2021 83.4  80 - 100 fL Final    MCH 11/16/2021 27.4  26 - 34 pg Final    MCHC 11/16/2021 32.9  31 - 37 g/dL Final    RDW 11/16/2021 13.3  11.5 - 14.9 % Final    Platelets 87/18/6816 267  150 - 450 k/uL Final    MPV 11/16/2021 12.4* 6.0 - 12.0 fL Final    NRBC Automated 11/16/2021 NOT REPORTED  per 100 WBC Final    Hemoglobin A1C 11/16/2021 5.9  4.0 - 6.0 % Final    Estimated Avg Glucose 11/16/2021 123  mg/dL Final    Comment: The ADA and AACC recommend providing the estimated average glucose result to permit better   patient understanding of their HBA1c result.       POC Glucose 11/16/2021 111* 65 - 105 mg/dL Final   Admission on 10/30/2021, Discharged on 10/30/2021   Component Date Value Ref Range Status    Color, UA 10/30/2021 Yellow  Yellow Final    Turbidity UA 10/30/2021 Clear  Clear Final    Glucose, Ur 10/30/2021 NEGATIVE  NEGATIVE Final    Bilirubin Urine 10/30/2021 NEGATIVE  NEGATIVE Final    Ketones, Urine 10/30/2021 NEGATIVE  NEGATIVE Final    Specific [place], UA 10/30/2021 1.028  1.000 - 1.030 Final    Urine Hgb 10/30/2021 NEGATIVE  NEGATIVE Final    pH, UA 10/30/2021 6.5  5.0 - 8.0 Final    Protein, UA 10/30/2021 NEGATIVE  NEGATIVE Final    Urobilinogen, Urine 10/30/2021 Normal  Normal Final    Nitrite, Urine 10/30/2021 NEGATIVE  NEGATIVE Final    Leukocyte Esterase, Urine 10/30/2021 NEGATIVE  NEGATIVE Final    Urinalysis Comments 10/30/2021 Microscopic exam not performed based on chemical results unless requested in original order. Final   Hospital Outpatient Visit on 10/26/2021   Component Date Value Ref Range Status    Specimen Description 10/27/2021 . VAGINA   Final    Special Requests 10/27/2021 NOT REPORTED   Final    Culture 10/27/2021 NORMAL URO-GENITAL EDWIN   Final    Culture 10/27/2021 NEGATIVE FOR NEISSERIA GONORRHOEAE   Final    Culture 10/27/2021 NEGATIVE FOR GROUP B STREPTOCOCCI   Final   ]    /69   Pulse 90   Temp 97.5 °F (36.4 °C) (Oral)   Resp 16   Ht 5' 1\" (1.549 m)   Wt 177 lb (80.3 kg)   SpO2 98%   BMI 33.44 kg/m²       Pelvic Exam:  Cervix Check:     DILATION:  4 cm   EFFACEMENT:   70%   STATION:  0 cm   CONSISTENCY:  soft   POSITION:  mid    FETAL POSITION: Cephalic    Assessment:  1Jackelin Bobby is a 31 y.o. female  39w0d     2.   OB History    Para Term  AB Living   3 2 2     2   SAB IAB Ectopic Molar Multiple Live Births           0 2      # Outcome Date GA Lbr Francisco J/2nd Weight Sex Delivery Anes PTL Lv   3 Current            2 Term 20 39w0d 00:43 / 00:09 6 lb 11.5 oz (3.048 kg) M Vag-Spont EPI N JACKSON   1 Term 2012 40w0d  6 lb 5 oz (2.863 kg) F    JACSKON         3. Encounter for induction of labor [Z34.90]      4.    Patient Active Problem List    Diagnosis

## 2021-11-16 NOTE — PROGRESS NOTES
LABOR PROGRESS NOTE      Patient Name: Kayla Han  Patient : 1995  Room/Bed: [facility]/4680-82  Admission Date/Time: 2021  6:20 AM  MRN #: [id number]  [facility] #: [id number]    Date: 2021  Time: 11:59 AM    The patient was seen and examined. Her pain is well controlled. She reports fetal movement is present, complains of contractions, complains of loss of fluid, denies vaginal bleeding. Denies s/s of preeclampsia including headache vision changes, difficulty breathing, chest pain, RUQ pain or worsening swelling of extremities. Vital Signs:  VS:  height is 5' 1\" (1.549 m) and weight is 177 lb (80.3 kg). Her oral temperature is 97.5 °F (36.4 °C). Her blood pressure is 112/76 and her pulse is 64. Her respiration is 16 and oxygen saturation is 99%.      FHT:    Baseline: 140   Variability: moderate              Accels: present   Decels: Absent      Contraction pattern:                                  Frequency: 2 minutes                                 Duration: 60-80 seconds                                 Intensity: moderate                                 Pitocin: 8 mu/min                                 IUPC:  No    Cervix: deferred      Status of membranes: SROM @ 1037    Pain control: controlled    Maternal status (hydration, fatigue, voids): adequate    Interventions: none    Assessment/Plan:  Kayla Han is a 31 y.o. female  at 39w0d admitted for IOL   - cEFM/ TOCO  - GBS negative,    - VSS, Afebrile    - IVF LR @ 125 ml/hr    - Induction method: pitocin   - Diet: clear   - Category 1 strip    Abnormal glucose in Pregnancy  - POCT 111 (1 hour after eating breakfast)  - Hemoglobin A1C 5.9%      Attending: TORY Clayton CNM  Midwife  2021, 11:59 AM

## 2021-11-16 NOTE — FLOWSHEET NOTE
Everyone in room is wearing a mask. 13:00 -  Dr. Fahad Kong at bedside and consent form signed. Risks and benefits discussed and patient verbalizes understanding. Patient verbalizes to proceed with procedure. Time out performed. 13:03p - to dangle position. 13:06p - procedure started. 13:15p  - test dose given. Patient tolerated procedure well. 13:19p -to low fowlers position with left hip wedge in place. 13:22p - epidural pump started. See anesthesia note.

## 2021-11-16 NOTE — ANESTHESIA PRE PROCEDURE
Department of Anesthesiology  Preprocedure Note       Name:  Anne Hernandez   Age:  31 y.o.  :  1995                                          MRN:  [id number]         Date:  2021      Surgeon: * No surgeons listed *    Procedure: * No procedures listed *    Medications prior to admission:   Prior to Admission medications    Medication Sig Start Date End Date Taking?  Authorizing Provider   Prenatal Vit-Iron Carbonyl-FA (PRENATABS RX) 29-1 MG TABS Take 1 tablet by mouth daily 19  Yes Leopoldo Handsome, APRN - CNM       Current medications:    Current Facility-Administered Medications   Medication Dose Route Frequency Provider Last Rate Last Admin    lactated ringers infusion   IntraVENous Continuous Sherrlyn Simpler, APRN -  mL/hr at 21 1310 New Bag at 21 1310    lactated ringers bolus  500 mL IntraVENous PRN Sherrlyn Simpler, APRN - CNM        Or    lactated ringers bolus  1,000 mL IntraVENous PRN Sherrlyn Simpler, APRN -  mL/hr at 21 1241 Rate Change at 21 1241    sodium chloride flush 0.9 % injection 5-40 mL  5-40 mL IntraVENous 2 times per day Sherrlyn Simpler, APRN - CNM        sodium chloride flush 0.9 % injection 5-40 mL  5-40 mL IntraVENous PRN Sherrlyn Simpler, APRN - CNM        0.9 % sodium chloride infusion  25 mL IntraVENous PRN Sherrlyn Simpler, APRN - CNM        ondansetron [facility]) injection 4 mg  4 mg IntraVENous Q6H PRN Sherrlyn Simpler, APRN - CNM        acetaminophen (TYLENOL) tablet 650 mg  650 mg Oral Q4H PRN Sherrlyn Simpler, APRN - CNM   650 mg at 21 0743    oxytocin (PITOCIN) 30 units in 500 mL infusion  1-20 dinesh-units/min IntraVENous Continuous Sherrlyn Simpler, APRN - CNM 10 mL/hr at 21 1323 10 dinesh-units/min at 21 1323    fentaNYL 2 mcg/mL and ropivacaine 0.2% in sodium chloride 0.9% 100 mL (OB) epidural  8 mL/hr Epidural Continuous Heidy Cisneros MD 8 mL/hr at 21 1310 8 mL/hr at 21 1310    naloxone (NARCAN) injection 0.4 mg Counseling given: Not Answered      Vital Signs (Current):   Vitals:    11/16/21 1332 11/16/21 1337 11/16/21 1342 11/16/21 1347   BP:       Pulse:       Resp:       Temp:       TempSrc:       SpO2: 100% 100% 98% 100%   Weight:       Height:                                                  BP Readings from Last 3 Encounters:   11/16/21 122/69   11/11/21 114/68   11/04/21 108/60       NPO Status:                                                                                 BMI:   Wt Readings from Last 3 Encounters:   11/16/21 177 lb (80.3 kg)   11/11/21 177 lb (80.3 kg)   11/04/21 175 lb (79.4 kg)     Body mass index is 33.44 kg/m².     CBC:   Lab Results   Component Value Date    WBC 15.0 11/16/2021    RBC 3.98 11/16/2021    HGB 10.9 11/16/2021    HCT 33.2 11/16/2021    MCV 83.4 11/16/2021    RDW 13.3 11/16/2021     11/16/2021       CMP:   Lab Results   Component Value Date     01/11/2020    K 3.9 01/11/2020     01/11/2020    CO2 21 01/11/2020    BUN 14 01/11/2020    CREATININE 0.41 01/11/2020    GFRAA >60 01/11/2020    LABGLOM >60 01/11/2020    GLUCOSE 187 01/29/2020    GLUCOSE 94 01/11/2020    PROT 7.1 01/11/2020    CALCIUM 8.6 01/11/2020    BILITOT 0.13 01/11/2020    ALKPHOS 128 01/11/2020    AST 16 01/11/2020    ALT 8 01/11/2020       POC Tests:   Recent Labs     11/16/21  0800   POCGLU 111*       Coags: No results found for: PROTIME, INR, APTT    HCG (If Applicable):   Lab Results   Component Value Date    HCGQUANT 26,988 (H) 09/11/2019        ABGs: No results found for: PHART, PO2ART, KZT0LDW, CTZ3THR, BEART, S8HRMHJP     Type & Screen (If Applicable):  No results found for: LABABO, LABRH    Drug/Infectious Status (If Applicable):  No results found for: HIV, HEPCAB    COVID-19 Screening (If Applicable):   Lab Results   Component Value Date    COVID19 Not Detected 11/16/2021           Anesthesia Evaluation  Patient summary reviewed and Nursing notes reviewed no history of anesthetic complications:   Airway: Mallampati: II  TM distance: >3 FB   Neck ROM: full  Mouth opening: > = 3 FB Dental: normal exam         Pulmonary:Negative Pulmonary ROS and normal exam  breath sounds clear to auscultation                             Cardiovascular:Negative CV ROS  Exercise tolerance: good (>4 METS),         ECG reviewed  Rhythm: regular  Rate: normal                    Neuro/Psych:   (+) psychiatric history:            GI/Hepatic/Renal: Neg GI/Hepatic/Renal ROS            Endo/Other: Negative Endo/Other ROS                    Abdominal:             Vascular: Other Findings:             Anesthesia Plan      epidural     ASA 2           MIPS: Postoperative opioids intended. Anesthetic plan and risks discussed with patient.                       Wolf Ricardo MD   11/16/2021
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
## 2021-11-16 NOTE — ANESTHESIA POSTPROCEDURE EVALUATION
Department of Anesthesiology  Postprocedure Note    Patient: Kemi Gordillo  MRN: [id number]  YOB: 1995  Date of evaluation: 11/16/2021  Time:  4:54 PM     Procedure Summary     Date: 11/16/21 Room / Location:     Anesthesia Start: 9585 Anesthesia Stop:     Procedure: Labor Analgesia Diagnosis:     Scheduled Providers:  Responsible Provider: Richie Zelaya MD    Anesthesia Type: epidural ASA Status: 2          Anesthesia Type: No value filed. Shawna Phase I: Shawna Score: 9    Shawna Phase II:      Last vitals: Reviewed and per EMR flowsheets.        Anesthesia Post Evaluation    Comments: POST- ANESTHESIA EVALUATION       Pt Name: Kemi Gordillo  MRN: [id number]  YOB: 1995  Date of evaluation: 11/16/2021  Time:  4:55 PM      BP (!) 95/59   Pulse 56   Temp 97.7 °F (36.5 °C) (Oral)   Resp 16   Ht 5' 1\" (1.549 m)   Wt 177 lb (80.3 kg)   SpO2 99%   BMI 33.44 kg/m²      Consciousness Level  Awake  Cardiopulmonary Status  Stable  Pain Adequately Treated YES  Nausea / Vomiting  NO  Adequate Hydration  YES  Anesthesia Related Complications NONE      Electronically signed by Richie Zelaya MD on 11/16/2021 at 4:55 PM

## 2021-11-16 NOTE — ANESTHESIA PROCEDURE NOTES
Epidural Block    Patient location during procedure: OB  Start time: 11/16/2021 1:03 PM  End time: 11/16/2021 1:20 PM  Reason for block: labor epidural  Staffing  Performed: anesthesiologist   Anesthesiologist: Fiordaliza Golden MD  Preanesthetic Checklist  Completed: patient identified, IV checked, risks and benefits discussed, surgical consent, monitors and equipment checked, pre-op evaluation, timeout performed, anesthesia consent given, oxygen available and patient being monitored  Epidural  Patient position: sitting  Prep: Betadine  Patient monitoring: continuous pulse ox and frequent blood pressure checks  Approach: midline  Location: lumbar (1-5)  Injection technique: DAVI air  Guidance: paresthesia technique  Provider prep: mask and sterile gloves  Needle  Needle type: Tuohy   Needle gauge: 17 G  Needle length: 3.5 in  Needle insertion depth: 5 cm  Catheter type: end hole  Catheter size: 19 G  Catheter at skin depth: 10 cm  Test dose: negative  Assessment  Sensory level: T10  Hemodynamics: stable  Attempts: 1
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
## 2021-11-17 VITALS
RESPIRATION RATE: 16 BRPM | HEART RATE: 62 BPM | OXYGEN SATURATION: 98 % | TEMPERATURE: 98.2 F | DIASTOLIC BLOOD PRESSURE: 68 MMHG | WEIGHT: 177 LBS | SYSTOLIC BLOOD PRESSURE: 118 MMHG | BODY MASS INDEX: 33.42 KG/M2 | HEIGHT: 61 IN

## 2021-11-17 PROBLEM — R10.9 CRAMPING AFFECTING PREGNANCY, ANTEPARTUM: Status: RESOLVED | Noted: 2021-10-30 | Resolved: 2021-11-17

## 2021-11-17 PROBLEM — O09.90 HIGH-RISK PREGNANCY: Status: RESOLVED | Noted: 2021-10-21 | Resolved: 2021-11-17

## 2021-11-17 PROBLEM — O26.899 CRAMPING AFFECTING PREGNANCY, ANTEPARTUM: Status: RESOLVED | Noted: 2021-10-30 | Resolved: 2021-11-17

## 2021-11-17 PROBLEM — O99.210 OBESITY AFFECTING PREGNANCY: Status: RESOLVED | Noted: 2021-09-28 | Resolved: 2021-11-17

## 2021-11-17 PROBLEM — Z34.90 ENCOUNTER FOR INDUCTION OF LABOR: Status: RESOLVED | Noted: 2021-11-16 | Resolved: 2021-11-17

## 2021-11-17 LAB
GLUCOSE FASTING: 112 MG/DL (ref 70–99)
HCT VFR BLD CALC: 29.4 % (ref 36–46)
HEMOGLOBIN: 9.6 G/DL (ref 12–16)

## 2021-11-17 PROCEDURE — 82947 ASSAY GLUCOSE BLOOD QUANT: CPT

## 2021-11-17 PROCEDURE — 6370000000 HC RX 637 (ALT 250 FOR IP): Performed by: ADVANCED PRACTICE MIDWIFE

## 2021-11-17 PROCEDURE — 36415 COLL VENOUS BLD VENIPUNCTURE: CPT

## 2021-11-17 PROCEDURE — 85014 HEMATOCRIT: CPT

## 2021-11-17 PROCEDURE — 85018 HEMOGLOBIN: CPT

## 2021-11-17 RX ORDER — FERROUS SULFATE 325(65) MG
325 TABLET ORAL DAILY
Qty: 30 TABLET | Refills: 0 | Status: SHIPPED | OUTPATIENT
Start: 2021-11-17

## 2021-11-17 RX ORDER — IBUPROFEN 800 MG/1
800 TABLET ORAL EVERY 6 HOURS PRN
Qty: 120 TABLET | Refills: 3 | Status: SHIPPED | OUTPATIENT
Start: 2021-11-17

## 2021-11-17 RX ORDER — FERROUS SULFATE 325(65) MG
325 TABLET ORAL DAILY
Status: DISCONTINUED | OUTPATIENT
Start: 2021-11-17 | End: 2021-11-17 | Stop reason: HOSPADM

## 2021-11-17 RX ORDER — SERTRALINE HYDROCHLORIDE 25 MG/1
25 TABLET, FILM COATED ORAL DAILY
Qty: 30 TABLET | Refills: 1 | Status: SHIPPED | OUTPATIENT
Start: 2021-11-17

## 2021-11-17 RX ADMIN — DOCUSATE SODIUM 100 MG: 100 CAPSULE, LIQUID FILLED ORAL at 08:34

## 2021-11-17 RX ADMIN — IBUPROFEN 800 MG: 800 TABLET, FILM COATED ORAL at 11:03

## 2021-11-17 RX ADMIN — ACETAMINOPHEN 650 MG: 325 TABLET ORAL at 08:34

## 2021-11-17 RX ADMIN — ACETAMINOPHEN 650 MG: 325 TABLET ORAL at 15:12

## 2021-11-17 RX ADMIN — IBUPROFEN 800 MG: 800 TABLET, FILM COATED ORAL at 04:36

## 2021-11-17 RX ADMIN — FERROUS SULFATE TAB 325 MG (65 MG ELEMENTAL FE) 325 MG: 325 (65 FE) TAB at 11:03

## 2021-11-17 ASSESSMENT — PAIN SCALES - GENERAL
PAINLEVEL_OUTOF10: 3

## 2021-11-17 ASSESSMENT — PAIN DESCRIPTION - FREQUENCY: FREQUENCY: INTERMITTENT

## 2021-11-17 ASSESSMENT — PAIN DESCRIPTION - LOCATION: LOCATION: ABDOMEN

## 2021-11-17 ASSESSMENT — PAIN DESCRIPTION - ONSET: ONSET: AWAKENED FROM SLEEP

## 2021-11-17 ASSESSMENT — PAIN DESCRIPTION - PAIN TYPE: TYPE: ACUTE PAIN

## 2021-11-17 ASSESSMENT — PAIN DESCRIPTION - ORIENTATION: ORIENTATION: LOWER;MID

## 2021-11-17 ASSESSMENT — PAIN DESCRIPTION - DESCRIPTORS: DESCRIPTORS: CRAMPING

## 2021-11-17 ASSESSMENT — PAIN - FUNCTIONAL ASSESSMENT: PAIN_FUNCTIONAL_ASSESSMENT: ACTIVITIES ARE NOT PREVENTED

## 2021-11-17 ASSESSMENT — PAIN DESCRIPTION - PROGRESSION: CLINICAL_PROGRESSION: GRADUALLY WORSENING

## 2021-11-17 NOTE — PLAN OF CARE
Problem: Sensory:  Goal: Ability to identify pain intensity on a pain scale and rate it consistently will improve  70/89/6535 4655 by Clarence Zarate RN  Outcome: Completed  11/17/2021 0458 by Nini Hart RN  Outcome: Met This Shift  Goal: Ability to maintain vital signs within normal range will improve  02/76/8498 7401 by Clarence Zarate RN  Outcome: Completed  11/17/2021 0458 by Nini Hart RN  Outcome: Met This Shift  Goal: Pain level will decrease  81/85/0127 7390 by Clarence Zarate RN  Outcome: Completed  11/17/2021 0458 by Nini Hart RN  Outcome: Met This Shift     Problem: Discharge Planning:  Goal: Discharged to appropriate level of care  53/73/7883 7427 by Clarence Zarate RN  Outcome: Completed  11/17/2021 0458 by Nini Hart RN  Outcome: Ongoing     Problem: Constipation:  Goal: Bowel elimination is within specified parameters  43/69/7529 0825 by Clarence Zarate RN  Outcome: Completed  11/17/2021 0458 by Nini Hart RN  Outcome: Ongoing     Problem: Fluid Volume - Imbalance:  Goal: Absence of imbalanced fluid volume signs and symptoms  47/92/9280 2769 by Clarence Zarate RN  Outcome: Completed  11/17/2021 0458 by Nini Hart RN  Outcome: Met This Shift  Goal: Absence of postpartum hemorrhage signs and symptoms  71/40/3765 5479 by Clarence Zarate RN  Outcome: Completed  11/17/2021 0458 by Nini Hart RN  Outcome: Met This Shift     Problem: Infection - Risk of, Puerperal Infection:  Goal: Will show no infection signs and symptoms  25/68/7824 0950 by Clarence Zarate RN  Outcome: Completed  11/17/2021 0458 by Nini Hart RN  Outcome: Met This Shift     Problem: Mood - Altered:  Goal: Mood stable  95/02/9530 7582 by Clarence Zarate RN  Outcome: Completed  11/17/2021 0458 by Nini Hart RN  Outcome: Met This Shift     Problem: Pain - Acute:  Goal: Pain level will decrease  41/26/9179 8913 by Clarence Zarate RN  Outcome: Completed  11/17/2021 0458 by Nini Hailey, RN  Outcome: Met This Shift Problem: Pain:  Goal: Pain level will decrease  21/18/1414 8414 by Katheryn Pickard RN  Outcome: Completed  11/17/2021 0458 by Julianna Gallo RN  Outcome: Met This Shift  Goal: Control of acute pain  04/01/6338 5738 by Katheryn Pickard RN  Outcome: Completed  11/17/2021 0458 by Julianna Gallo RN  Outcome: Met This Shift  Goal: Control of chronic pain  17/06/6119 2786 by Katheryn Pickard RN  Outcome: Completed  11/17/2021 0458 by Julianna Gallo RN  Outcome: Met This Shift

## 2021-11-17 NOTE — FLOWSHEET NOTE
Patient admitted to room 176 from home per ambulatory. Here for scheduled induction. Denies ROM or vaginal bleeding. Denies N/V/D. Denies fever/chills. Relates of + fetal movement. Request for urine sample made. Urine drug screen explained to patient. Instructed on clean catch urine. Consent obtained. Patient verbalizes understanding and acceptance. Assisted into bed, Siderails up x2. Call light in reach. Bed in low position. Oriented to room, surroundings, call system and plan of care. Patient verbalizes understanding. EFM applied and monitor test completed/passed.

## 2021-11-17 NOTE — PROGRESS NOTES
POST PARTUM DAY # 1    Patient Name: Vinita Bobby  Patient : 1995  Room/Bed: 9502/7850-83  Admission Date/Time: 2021  6:20 AM  MRN #: [id number]  [facility] #: [id number]    OBGYN Provider: Dr. Rayshawn Rainey    Date: 2021  Time: 10:45 AM        Vinita Bobby is a 31 y.o. female  This patient was seen today. She Delivered on 21. Her pregnancy was complicated by:     Patient Active Problem List   Diagnosis    Depression    PTSD (post-traumatic stress disorder)    H/O splenectomy     History of cryosurgery of cervix affecting pregnancy    Obesity    HRP (high risk pregnancy), third trimester    History of blood transfusion    Tetrahydrocannabinol (THC) use disorder, mild, abuse     3/19/20 M APG 8/9 Wt 6#7    Nicotine dependence, cigarettes, uncomplicated    Anxiety during pregnancy, antepartum    Asplenia after surgical procedure    History of gestational diabetes in prior pregnancy, currently pregnant    High-risk pregnancy    Obesity affecting pregnancy    Anemia affecting pregnancy in third trimester    Cramping affecting pregnancy, antepartum    Encounter for induction of labor    39 weeks gestation of pregnancy       Today she is doing well without any chief complaint. Her lochia is light. She denies chest pain, shortness of breath, headache and blurred vision. She is  breast feeding and she denies any signs or symptoms of mastitis. These were reviewed with her in detail. She is ambulating well. Her bowels are regular. Her voiding pattern is Normal. I reviewed signs and symptoms of post partum depression with the patient, she currently admits to anxiety symptoms. She states she was taking Zoloft during her pregnancy, which was helpful. She discontinued the Zoloft 2 weeks prior to delivery and would like to restart the medication. She endorses having trouble after her 2 previous children with anxiety and depression.  I have counseled this patient on secondary smoke risks and the increased incidence of sudden infant death syndrome. Vitals:    21 1803 21 1929 21 0430 21 0834   BP: 114/67 118/65 111/66    Pulse: 61 60     Resp:    Temp:  97.5 °F (36.4 °C) 97.8 °F (36.6 °C) 98 °F (36.7 °C)   TempSrc:  Oral Oral Oral   SpO2:    98%   Weight:       Height:           Constitutional:  Awake, alert, cooperative, no apparent distress. Appears to be bonding well with baby, does not appear overly stressed with infant handling    Pain:  Pt reports her cramping is much worse this time compared to her previous deliveries. She reports motrin and tylenol are helpful. Breasts:  Soft without tenderness, nipples are intact    Abdominal Exam: Soft, non-tender, lax muscle tone                                Fundus is mid-line, firm @ U/-1. Non-tender with palpation. Bladder non-distended    Perineum: Intact and healing. no erythema, edema or bruising present                      Anus: Normal in appearance                                   Lochia: Small and Rubra    Extremities: Negative Orlando sign. Minimal edema     Voiding QS, + BM    Labs:   Lab Results   Component Value Date    HGB 9.6 2021    HCT 29.4 2021       Assessment/Plan:  1. Javi Catalan is a Y2G2459 PPD # 1 s/p         - Doing well, VSS               - male infant in [place] [address] Nursery   -  Encourage ambulation   - Motrin/Tylenol PRN  - Discharge planned for today  - RTO in 2 weeks     -  Counseling Completed: Home care, Follow up Care, and birth control review completed. Secondary Smoke risks and Sudden Infant Death Syndrome were reviewed with recommendations. Signs and Symptoms of Post Partum Depression were reviewed. The patient is to call if any occur. Signs and symptoms of Mastitis were reviewed. The patient is to call if any occur for follow up.     2. Breast feeding without difficulty  - Breast care reviewed    3. Rh +/Rubella immune    4. Anemia   - start PO iron    5.  Anxoety   - restart Zoloft  Attending: Dr. Charo Overton    Electronically signed by TORY Ewing CNM on 11/17/2021 at 10:45 AM

## 2021-11-17 NOTE — DISCHARGE SUMMARY
Obstetric Discharge Summary  [facility]    Patient Name: Adeola Grider  Patient : 1995  Room/Bed: [facility]2231-  Primary Care Physician: Edwar May MD  Admit Date: 2021  6:20 AM  MRN #: [id number]  CSN #: [id number]  OBGYN: Dr. Tierra Sullivan Diagnosis: IUP at 39w0d, admitted for Induction of Labor       Her pregnancy has been complicated by:   Patient Active Problem List   Diagnosis    Depression    PTSD (post-traumatic stress disorder)    H/O splenectomy     History of cryosurgery of cervix affecting pregnancy    Obesity    HRP (high risk pregnancy), third trimester    History of blood transfusion    Tetrahydrocannabinol (THC) use disorder, mild, abuse     3/19/20 M APG 8/9 Wt 6#7    Nicotine dependence, cigarettes, uncomplicated    Anxiety during pregnancy, antepartum    Asplenia after surgical procedure    History of gestational diabetes in prior pregnancy, currently pregnant    Anemia affecting pregnancy in third trimester     (spontaneous vaginal delivery)    Lactating mother       Infection Present?: No  Hospital Acquired: No    Surgical Operations & Procedures:  [] Pitocin Induction of Labor  [x] Pitocin Augmentation of Labor  [] Prostaglandin Induction of Labor  [] Cytotec (Misoprostol)  [] Mechanical Induction of Labor  [] Artificial Rupture of Membranes  [] Intrauterine Pressure Catheter  [] Fetal Scalp Electrode  [] Amnioinfusion  [] Antibiotic Prophylaxis    Analgesia: epidural  Delivery Type: Spontaneous Vaginal Delivery: See delivery summary  Laceration(s): Absent    Consultations: Anesthesia    Pertinent Findings & Procedures:   Adeola Grider is a 31 y.o. female  at 39w0d admitted for IOL; received pitocin induction.  She delivered by spontaneous vaginal a Live Born      Information for the patient's :  Adam Hargrove [[id number]]   male   Birth Weight: 8 lb 0.4 oz (3.64 kg)       Apgars:   Information for the patient's :  Nance Gitelman [[id number]]           Medication List      START taking these medications    ferrous sulfate 325 (65 Fe) MG tablet  Commonly known as: IRON 325  Take 1 tablet by mouth daily     ibuprofen 800 MG tablet  Commonly known as: ADVIL;MOTRIN  Take 1 tablet by mouth every 6 hours as needed for Pain     sertraline 25 MG tablet  Commonly known as: Zoloft  Take 1 tablet by mouth daily 1 daily for 7 days then increase to 50 mg daily        CONTINUE taking these medications    Prenatabs Rx 29-1 MG Tabs  Take 1 tablet by mouth daily           Where to Get Your Medications      You can get these medications from any pharmacy    Bring a paper prescription for each of these medications  · ferrous sulfate 325 (65 Fe) MG tablet  · ibuprofen 800 MG tablet  · sertraline 25 MG tablet       Active Hospital Problems    Diagnosis Date Noted     (spontaneous vaginal delivery) [O80] 2021    Lactating mother [Z39.1] 2021     Postpartum course: normal.      Course of patient: uncomplicated    Discharge to: Home    Readmission planned: no     Recommendations on Discharge: Activity: Slow return to ADLs, nothing per vagina x 6 weeks, no driving x 2 weeks  Diet: Regular  Follow up: 2 weeks with Dr. Ankit Thompson on discharge: good    Discharge Date: 2021    TORY Lindsay CNM    Comments:   Home care, Follow-up care and birth control were reviewed. Signs and symptoms of mastitis and Post Partum Depression were reviewed. The patient is to notify her physician if any of these occur. The patient was counseled on secondary smoke risks and the increased risk of sudden infant death syndrome and respiratory problems to her baby with exposure. She was counseled on various alternate recommendations to decrease the exposure to secondary smoke to her children.

## 2021-11-17 NOTE — PLAN OF CARE
Problem: Sensory:  Goal: Ability to identify pain intensity on a pain scale and rate it consistently will improve  Description: Ability to identify pain intensity on a pain scale and rate it consistently will improve  Outcome: Met This Shift  Goal: Ability to maintain vital signs within normal range will improve  Description: Ability to maintain vital signs within normal range will improve  Outcome: Met This Shift  Goal: Pain level will decrease  Description: Pain level will decrease  Outcome: Met This Shift     Problem: Fluid Volume - Imbalance:  Goal: Absence of imbalanced fluid volume signs and symptoms  Description: Absence of imbalanced fluid volume signs and symptoms  Outcome: Met This Shift  Goal: Absence of postpartum hemorrhage signs and symptoms  Description: Absence of postpartum hemorrhage signs and symptoms  Outcome: Met This Shift     Problem: Infection - Risk of, Puerperal Infection:  Goal: Will show no infection signs and symptoms  Description: Will show no infection signs and symptoms  Outcome: Met This Shift     Problem: Mood - Altered:  Goal: Mood stable  Description: Mood stable  Outcome: Met This Shift     Problem: Pain - Acute:  Goal: Pain level will decrease  Description: Pain level will decrease  Outcome: Met This Shift     Problem: Pain:  Goal: Pain level will decrease  Description: Pain level will decrease  Outcome: Met This Shift  Goal: Control of acute pain  Description: Control of acute pain  Outcome: Met This Shift  Goal: Control of chronic pain  Description: Control of chronic pain  Outcome: Met This Shift

## 2021-11-17 NOTE — CARE COORDINATION
Referral received for hx of anxiety. Review of EMR indicated hx of PTSD as well. Review of lab indicated negative drug screen. Pt was drug testing multiple times at the [employer] location. Pt lives in [place]. Met with pt; KAYLA Robison sleeping on the couch. Pt was tending to baby's needs. Pt lives with KAYLA and her 2 other children. She is linked with [facility] and receives food stamps. Pt acknowledged a hx of anxiety, PTSD and postpartum depression. Pt denied any concerns and stated she is linked with [facility] for counseling services. Pt has not spoke with anyone for some time d/t going to \"classes\". She stated she had to take classes because she \"got into some trouble\". Pt has been on probation since March and was related to alcohol. Per Vimal Simon RN pt has an ankle monitor. Pt denied any current concerns and has all necessary provisions for baby. [address] [facility] and provided report to Tanmay due to recent legal concerns. 3:45 PM Follow up call to [facility] and spoke with Tanmay. She stated pt was safe for discharge. Contacted unit and notified Vimal Simon RN.

## 2021-11-18 LAB — SURGICAL PATHOLOGY REPORT: NORMAL

## 2023-06-20 ENCOUNTER — HOSPITAL ENCOUNTER (OUTPATIENT)
Dept: HOSPITAL 101 - LAB | Age: 28
Discharge: HOME | End: 2023-06-20
Payer: COMMERCIAL

## 2023-06-20 DIAGNOSIS — Z36.85: ICD-10-CM

## 2023-06-20 DIAGNOSIS — Z34.93: Primary | ICD-10-CM

## 2023-06-20 PROCEDURE — 87081 CULTURE SCREEN ONLY: CPT

## 2023-07-03 ENCOUNTER — HOSPITAL ENCOUNTER (INPATIENT)
Age: 28
LOS: 1 days | Discharge: HOME | DRG: 560 | End: 2023-07-04
Payer: COMMERCIAL

## 2023-07-03 VITALS — DIASTOLIC BLOOD PRESSURE: 56 MMHG | HEART RATE: 76 BPM | SYSTOLIC BLOOD PRESSURE: 122 MMHG

## 2023-07-03 VITALS — HEART RATE: 72 BPM | SYSTOLIC BLOOD PRESSURE: 108 MMHG | DIASTOLIC BLOOD PRESSURE: 60 MMHG

## 2023-07-03 VITALS — DIASTOLIC BLOOD PRESSURE: 56 MMHG | SYSTOLIC BLOOD PRESSURE: 101 MMHG | HEART RATE: 68 BPM

## 2023-07-03 VITALS
HEART RATE: 56 BPM | SYSTOLIC BLOOD PRESSURE: 132 MMHG | TEMPERATURE: 98.24 F | DIASTOLIC BLOOD PRESSURE: 63 MMHG | RESPIRATION RATE: 16 BRPM

## 2023-07-03 VITALS — DIASTOLIC BLOOD PRESSURE: 53 MMHG | HEART RATE: 69 BPM | SYSTOLIC BLOOD PRESSURE: 114 MMHG

## 2023-07-03 VITALS — SYSTOLIC BLOOD PRESSURE: 113 MMHG | HEART RATE: 60 BPM | DIASTOLIC BLOOD PRESSURE: 59 MMHG

## 2023-07-03 VITALS — HEART RATE: 67 BPM | SYSTOLIC BLOOD PRESSURE: 113 MMHG | DIASTOLIC BLOOD PRESSURE: 79 MMHG

## 2023-07-03 VITALS — SYSTOLIC BLOOD PRESSURE: 120 MMHG | DIASTOLIC BLOOD PRESSURE: 56 MMHG | HEART RATE: 56 BPM

## 2023-07-03 VITALS — SYSTOLIC BLOOD PRESSURE: 121 MMHG | DIASTOLIC BLOOD PRESSURE: 72 MMHG

## 2023-07-03 VITALS — HEART RATE: 65 BPM | SYSTOLIC BLOOD PRESSURE: 109 MMHG | DIASTOLIC BLOOD PRESSURE: 58 MMHG

## 2023-07-03 VITALS — HEART RATE: 71 BPM | SYSTOLIC BLOOD PRESSURE: 113 MMHG | DIASTOLIC BLOOD PRESSURE: 57 MMHG

## 2023-07-03 VITALS — SYSTOLIC BLOOD PRESSURE: 109 MMHG | DIASTOLIC BLOOD PRESSURE: 59 MMHG | HEART RATE: 71 BPM

## 2023-07-03 VITALS — DIASTOLIC BLOOD PRESSURE: 51 MMHG | HEART RATE: 73 BPM | SYSTOLIC BLOOD PRESSURE: 96 MMHG

## 2023-07-03 VITALS — HEART RATE: 51 BPM | DIASTOLIC BLOOD PRESSURE: 63 MMHG | SYSTOLIC BLOOD PRESSURE: 117 MMHG

## 2023-07-03 VITALS — DIASTOLIC BLOOD PRESSURE: 78 MMHG | HEART RATE: 59 BPM | TEMPERATURE: 97.3 F | SYSTOLIC BLOOD PRESSURE: 134 MMHG

## 2023-07-03 VITALS — DIASTOLIC BLOOD PRESSURE: 66 MMHG | HEART RATE: 80 BPM | SYSTOLIC BLOOD PRESSURE: 115 MMHG

## 2023-07-03 VITALS — SYSTOLIC BLOOD PRESSURE: 132 MMHG | DIASTOLIC BLOOD PRESSURE: 63 MMHG | HEART RATE: 56 BPM

## 2023-07-03 VITALS — SYSTOLIC BLOOD PRESSURE: 126 MMHG | DIASTOLIC BLOOD PRESSURE: 61 MMHG | HEART RATE: 62 BPM

## 2023-07-03 VITALS — HEART RATE: 61 BPM | SYSTOLIC BLOOD PRESSURE: 88 MMHG | DIASTOLIC BLOOD PRESSURE: 49 MMHG

## 2023-07-03 VITALS — SYSTOLIC BLOOD PRESSURE: 114 MMHG | HEART RATE: 80 BPM | DIASTOLIC BLOOD PRESSURE: 63 MMHG

## 2023-07-03 VITALS — DIASTOLIC BLOOD PRESSURE: 70 MMHG | SYSTOLIC BLOOD PRESSURE: 124 MMHG | HEART RATE: 60 BPM

## 2023-07-03 VITALS — HEART RATE: 63 BPM | SYSTOLIC BLOOD PRESSURE: 123 MMHG | DIASTOLIC BLOOD PRESSURE: 69 MMHG

## 2023-07-03 VITALS — DIASTOLIC BLOOD PRESSURE: 52 MMHG | HEART RATE: 76 BPM | SYSTOLIC BLOOD PRESSURE: 93 MMHG

## 2023-07-03 VITALS — HEART RATE: 58 BPM | DIASTOLIC BLOOD PRESSURE: 60 MMHG | SYSTOLIC BLOOD PRESSURE: 116 MMHG

## 2023-07-03 VITALS — DIASTOLIC BLOOD PRESSURE: 76 MMHG | HEART RATE: 71 BPM | SYSTOLIC BLOOD PRESSURE: 134 MMHG

## 2023-07-03 VITALS — HEART RATE: 51 BPM | SYSTOLIC BLOOD PRESSURE: 117 MMHG | DIASTOLIC BLOOD PRESSURE: 67 MMHG

## 2023-07-03 VITALS — DIASTOLIC BLOOD PRESSURE: 56 MMHG | SYSTOLIC BLOOD PRESSURE: 108 MMHG

## 2023-07-03 VITALS — SYSTOLIC BLOOD PRESSURE: 108 MMHG | DIASTOLIC BLOOD PRESSURE: 59 MMHG | HEART RATE: 54 BPM

## 2023-07-03 VITALS — HEART RATE: 55 BPM | DIASTOLIC BLOOD PRESSURE: 68 MMHG | SYSTOLIC BLOOD PRESSURE: 111 MMHG

## 2023-07-03 VITALS — SYSTOLIC BLOOD PRESSURE: 115 MMHG | HEART RATE: 54 BPM | DIASTOLIC BLOOD PRESSURE: 72 MMHG

## 2023-07-03 VITALS — HEART RATE: 99 BPM | DIASTOLIC BLOOD PRESSURE: 57 MMHG | SYSTOLIC BLOOD PRESSURE: 117 MMHG

## 2023-07-03 VITALS — SYSTOLIC BLOOD PRESSURE: 115 MMHG | DIASTOLIC BLOOD PRESSURE: 70 MMHG | HEART RATE: 54 BPM

## 2023-07-03 VITALS — HEART RATE: 67 BPM | SYSTOLIC BLOOD PRESSURE: 114 MMHG | DIASTOLIC BLOOD PRESSURE: 65 MMHG

## 2023-07-03 VITALS — SYSTOLIC BLOOD PRESSURE: 110 MMHG | HEART RATE: 66 BPM | DIASTOLIC BLOOD PRESSURE: 58 MMHG

## 2023-07-03 VITALS — DIASTOLIC BLOOD PRESSURE: 55 MMHG | SYSTOLIC BLOOD PRESSURE: 108 MMHG | HEART RATE: 57 BPM

## 2023-07-03 VITALS — HEART RATE: 73 BPM | SYSTOLIC BLOOD PRESSURE: 104 MMHG | DIASTOLIC BLOOD PRESSURE: 65 MMHG

## 2023-07-03 VITALS — HEART RATE: 65 BPM | SYSTOLIC BLOOD PRESSURE: 102 MMHG | DIASTOLIC BLOOD PRESSURE: 53 MMHG

## 2023-07-03 VITALS — DIASTOLIC BLOOD PRESSURE: 64 MMHG | HEART RATE: 60 BPM | SYSTOLIC BLOOD PRESSURE: 110 MMHG

## 2023-07-03 DIAGNOSIS — Z3A.39: ICD-10-CM

## 2023-07-03 DIAGNOSIS — Z88.2: ICD-10-CM

## 2023-07-03 LAB
CANNABINOID SCREEN URINE: NEGATIVE
HCT VFR BLD CALC: 26.4 % (ref 36–48)
HEMATOCRIT: 26.4 % (ref 36–48)
HEMOGLOBIN: 8.5 G/DL (ref 12–16)
MCV RBC: 84.9 FL (ref 81–99)
MEAN CORPUSCULAR HEMOGLOBIN: 27.3 PG (ref 26.7–34)
MEAN CORPUSCULAR HGB CONC: 32.2 G/DL (ref 29.9–35.2)
MEAN CORPUSCULAR VOLUME: 84.9 FL (ref 81–99)
METHAMPHETAMINES SCREEN URINE: NEGATIVE
PLATELET # BLD: 227 10^3/UL (ref 150–450)
PLATELET COUNT: 227 10^3/UL (ref 150–450)
RED BLOOD COUNT: 3.11 10^6/UL (ref 4.2–5.4)
TRICYCLIC ANTIDEPRESSANT URINE: NEGATIVE
WBC # BLD: 12.6 10^3/UL (ref 4–11)
WHITE BLOOD COUNT: 12.6 10^3/UL (ref 4–11)

## 2023-07-03 PROCEDURE — 85007 BL SMEAR W/DIFF WBC COUNT: CPT

## 2023-07-03 PROCEDURE — 59410 OBSTETRICAL CARE: CPT

## 2023-07-03 PROCEDURE — 51702 INSERT TEMP BLADDER CATH: CPT

## 2023-07-03 PROCEDURE — 80307 DRUG TEST PRSMV CHEM ANLYZR: CPT

## 2023-07-03 PROCEDURE — 85027 COMPLETE CBC AUTOMATED: CPT

## 2023-07-03 PROCEDURE — 86901 BLOOD TYPING SEROLOGIC RH(D): CPT

## 2023-07-03 PROCEDURE — 96376 TX/PRO/DX INJ SAME DRUG ADON: CPT

## 2023-07-03 PROCEDURE — 86850 RBC ANTIBODY SCREEN: CPT

## 2023-07-03 PROCEDURE — 59050 FETAL MONITOR W/REPORT: CPT

## 2023-07-03 PROCEDURE — 85025 COMPLETE CBC W/AUTO DIFF WBC: CPT

## 2023-07-03 PROCEDURE — 36415 COLL VENOUS BLD VENIPUNCTURE: CPT

## 2023-07-03 PROCEDURE — 96374 THER/PROPH/DIAG INJ IV PUSH: CPT

## 2023-07-03 PROCEDURE — 96375 TX/PRO/DX INJ NEW DRUG ADDON: CPT

## 2023-07-03 PROCEDURE — 86900 BLOOD TYPING SEROLOGIC ABO: CPT

## 2023-07-03 NOTE — PM.OBPRCVD
Procedure
Intrapartal events: None
Induction method: per pitocin protocol
Delivery augmentation: rupture of membranes
Delivery monitor: external FHT and external uterine
Route of delivery: 
Laceration description: none
Estimated blood loss (mL): 150
Anesthesia type: Epidural
Disposition: floor
Infant
Delivery date: 23
Gender: female
Fetal presentation: vertex
Placental delivery description: Spontaneous
Fetal cord description: 3 Vessels, Nuchal Cord and Tight

## 2023-07-03 NOTE — W.PC.ACHO
Registration Status: ADM IN
Primary Language: English
Preferred Language: English

Active Medications

Generic Name Dose Route Start Last Admin
  Trade Name Freq  PRN Reason Stop Dose Admin
Acetaminophen  650 mg  07/03/23 14:30 
  Acetaminophen 325 Mg Tablet  PO  
  Q6H PRN  
  Mild Pain  
Al Hydroxide/Mg Hydroxide  2,400 mg  07/03/23 14:30 
  Magnesium Hydroxide 2,400 Mg/10 Ml Oral.Susp  PO  
  Q6H PRN  
  Dyspepsia  
Benzocaine/Menthol  1 applic  07/03/23 14:30 
  Benzocaine/Menthol 85 Gram Bottle  TOPICAL  
  Q1H PRN  
  Pain  
Carboprost Tromethamine  250 mcg  07/03/23 07:16 
  Carboprost Tromethamine 250 Mcg/Ml 1 Ml  Vial  IM  
  Q15M PRN  
  Bleeding  
Diphenhydramine HCl  25 mg  07/03/23 10:43  07/03/23 16:14
  Diphenhydramine Hcl 50 Mg/Ml (1ml) Vial  IV   25 mg
  Q6H PRN   Administration
  Itching  
Docusate Sodium  100 mg  07/04/23 09:00 
  Docusate Sodium 100 Mg Capsule  PO  
  BID RUTHIE  
Ephedrine Sulfate  5 mg  07/03/23 10:43 
  Ephedrine Sulfate 50 Mg/Ml Vial  IV  
  Q5M PRN  
  Blood Pressure - Low  
Fentanyl Citrate  100 mcg  07/03/23 10:43  07/03/23 17:29
  Fentanyl Citrate/Pf 100 Mcg/2 Ml Vial  EPIDURAL   100 mcg
  Q4H PRN   Administration
  Pain  
Sodium Chloride  1,000 mls @ 125 mls/hr  07/03/23 07:30  07/03/23 11:47
  Sodium Chloride 0.9% 1,000 Ml  IV   125 mls/hr
  .Q8H [facility]   Administration
Oxytocin 10 unit/ Sodium  501 mls @ 6.012 mls/hr  07/03/23 07:30  07/03/23 08:27
  Chloride  IV   2 milliunit/min
  Q24H RUTHIE   6.012 mls/hr
    Administration
  2 MILLIUNIT/MIN  
Ropivacaine/Sodium Chloride  400 mg in 200 mls @ 6 mls/hr  07/03/23 10:45 
  Naropin 0.2% 400 Mg/200 Ml Bag  EPIDURAL  
  Q24H [facility]  
Oxytocin 20 unit/ Sodium  1,002 mls @ 125 mls/hr  07/03/23 14:30 
  Chloride  IV  07/03/23 22:29 
  Q8H RUTHIE  
Ibuprofen  600 mg  07/03/23 14:30 
  Ibuprofen 600 Mg Tablet  PO  
  Q6H PRN  
  Moderate Pain  
Lidocaine  5 ml  07/03/23 07:16 
  Lidocaine Viscous 2% 15 Ml Topical Solution  TOPICAL  
  ONCE PRN  
  Pain  
Lidocaine  1 ml  07/03/23 07:16 
  Lidocaine Hcl 1% 200 Mg/20 Ml Mdv  INJ  
  ONCE PRN  
  Pain  
Lidocaine  5 ml  07/03/23 10:43 
  Lidocaine Hcl 2% Pf 100 Mg/5 Ml Vial  INJ  
  Q1H PRN  
  Pain  
Methylergonovine Maleate  0.2 mg  07/03/23 07:16 
  Methylergonovine Maleate 0.2 Mg Tablet  PO  
  Q4H PRN  
  Uterine Contractility/Contract  
Methylergonovine Maleate  0.2 mg  07/03/23 07:16 
  Methylergonovine Maleate 0.2 Mg/Ml Ampule  IM  
  ONCE PRN  
  Uterine Contractility/Contract  
Misoprostol  600 mcg  07/03/23 07:16 
  Misoprostol 100 Mcg Tablet  PO  
  ONCE PRN  
  Uterine Bleeding  
Misoprostol  800 mcg  07/03/23 07:16 
  Misoprostol 100 Mcg Tablet  SL  
  ONCE PRN  
  Uterine Bleeding  
Misoprostol  1,000 mcg  07/03/23 07:16 
  Misoprostol 100 Mcg Tablet  DE  
  ONCE PRN  
  Uterine Bleeding  
Nalbuphine HCl  10 mg  07/03/23 07:16 
  Nalbuphine Hcl 10 Mg/Ml Ampule  IV  
  Q3H PRN  
  Pain  
Ondansetron HCl  4 mg  07/03/23 07:16 
  Ondansetron Pf 4 Mg/2 Ml Vial  IV  
  Q6H PRN  
  Nausea And Vomiting  
Ondansetron HCl  4 mg  07/03/23 07:16 
  Ondansetron 4 Mg Rapdis Tablet  SL  
  Q6H PRN  
  Nausea And Vomiting  
Oxytocin  10 unit  07/03/23 07:16 
  Oxytocin 100 Unit/10 Ml Vial  IM  
  ONCE PRN  
  Uterine Bleeding  
Senna  17.2 mg  07/03/23 20:00 
  Sennosides 8.6 Mg Tablet  PO  
  QHS PRN  
  Constipation  
Simethicone  80 mg  07/03/23 14:30 
  Simethicone 80 Mg Tab.Chew  PO  
  QID PRN  
  Abdominal Distention  
Witch Hazel/Glycerin  1 each  07/03/23 14:30 
  Glycerin/Witch Hazel 1 Each Jar  TOPICAL  
  Q1H PRN  
  Pain  

Diet

 Category Date Time Status
 Regular Consistency Diet Diet  07/03/23 Dinner Active

Consults

 Category Date Time Status
 Consult to Anesthesiology Routine Cons  07/03/23 Ordered

IV Insertion/Site

Date of IV Line Insertion [    07/03/23                                          
left Forearm]                  
IV Insertion Time [left        07:50                                             
Forearm]                       


Neurology

Patient orientation (short     person,place,time,situation                       
list)                          
Patient orientation (short     person,place,time,situation                       
list)                          


Respiratory

Lung sounds [Throughout]       clear                                             
Lung sounds [Throughout]       clear                                             
Oxygen Delivery Method         Room Air                                          
Oxygen Delivery Method         Room Air                                          


Catheter

Date Urinary Catheter Removed  07/03/23

## 2023-07-04 VITALS — HEART RATE: 49 BPM | SYSTOLIC BLOOD PRESSURE: 116 MMHG | DIASTOLIC BLOOD PRESSURE: 58 MMHG

## 2023-07-04 LAB
ADD MANUAL DIFF: YES
ATYPICAL LYMPHOCYTES % MANUAL: 4 %
BASOPHILS ABS MANUAL: 0 10^3/UL (ref 0–0.1)
BASOPHILS NFR SPEC MANUAL: 0 % (ref 0.2–2)
BURR CELLS: (no result)
EOSINOPHILS ABSOLUTE MANUAL: 0.51 10^3/UL (ref 0–0.7)
EOSINOPHILS PERCENT MANUAL: 3 % (ref 0.9–7)
HCT VFR BLD CALC: 26.7 % (ref 36–48)
HEMATOCRIT: 26.7 % (ref 36–48)
HEMOGLOBIN: 8.4 G/DL (ref 12–16)
LYMPHOCYTES ABSOLUTE MANUAL: 4.95 10^3/UL (ref 1.2–3.8)
LYMPHOCYTES PERCENT MANUAL: 29 % (ref 20.5–60)
MCV RBC: 83.4 FL (ref 81–99)
MEAN CORPUSCULAR HEMOGLOBIN: 26.3 PG (ref 26.7–34)
MEAN CORPUSCULAR HGB CONC: 31.5 G/DL (ref 29.9–35.2)
MEAN CORPUSCULAR VOLUME: 83.4 FL (ref 81–99)
MONOCYTES ABSOLUTE MANUAL: 0.68 10^3/UL (ref 0.3–0.8)
MONOCYTES PERCENT MANUAL: 4 % (ref 1.7–12)
PLATELET # BLD: 266 10^3/UL (ref 150–450)
PLATELET COUNT: 266 10^3/UL (ref 150–450)
POIKILOCYTOSIS: (no result)
RED BLOOD COUNT: 3.2 10^6/UL (ref 4.2–5.4)
SCHISTOCYTES: (no result)
SEGMENTED NEUT ABSOLUTE MANUAL: 10.26 10^3/UL (ref 1.4–6.5)
SEGMENTED NEUTROPHILS % MANUAL: 60
WBC # BLD: 17.1 10^3/UL (ref 4–11)
WHITE BLOOD COUNT: 17.1 10^3/UL (ref 4–11)

## 2023-07-04 NOTE — W.PC.ACHO
Registration Status: ADM IN
Primary Language: English
Preferred Language: English

Active Medications

Generic Name Dose Route Start Last Admin
  Trade Name Freq  PRN Reason Stop Dose Admin
Acetaminophen  650 mg  07/03/23 14:30  07/04/23 01:34
  Acetaminophen 325 Mg Tablet  PO   650 mg
  Q6H PRN   Administration
  Mild Pain  
Al Hydroxide/Mg Hydroxide  2,400 mg  07/03/23 14:30 
  Magnesium Hydroxide 2,400 Mg/10 Ml Oral.Susp  PO  
  Q6H PRN  
  Dyspepsia  
Benzocaine/Menthol  1 applic  07/03/23 14:30 
  Benzocaine/Menthol 85 Gram Bottle  TOPICAL  
  Q1H PRN  
  Pain  
Carboprost Tromethamine  250 mcg  07/03/23 07:16 
  Carboprost Tromethamine 250 Mcg/Ml 1 Ml  Vial  IM  
  Q15M PRN  
  Bleeding  
Diphenhydramine HCl  25 mg  07/03/23 10:43  07/03/23 16:14
  Diphenhydramine Hcl 50 Mg/Ml (1ml) Vial  IV   25 mg
  Q6H PRN   Administration
  Itching  
Docusate Sodium  100 mg  07/04/23 09:00 
  Docusate Sodium 100 Mg Capsule  PO  
  BID RUTHIE  
Ephedrine Sulfate  5 mg  07/03/23 10:43 
  Ephedrine Sulfate 50 Mg/Ml Vial  IV  
  Q5M PRN  
  Blood Pressure - Low  
Fentanyl Citrate  100 mcg  07/03/23 10:43  07/03/23 17:29
  Fentanyl Citrate/Pf 100 Mcg/2 Ml Vial  EPIDURAL   100 mcg
  Q4H PRN   Administration
  Pain  
Sodium Chloride  1,000 mls @ 125 mls/hr  07/03/23 07:30  07/03/23 11:47
  Sodium Chloride 0.9% 1,000 Ml  IV   125 mls/hr
  .Q8H RUTHIE   Administration
Oxytocin 10 unit/ Sodium  501 mls @ 6.012 mls/hr  07/03/23 07:30  07/03/23 08:27
  Chloride  IV   2 milliunit/min
  Q24H RUTHIE   6.012 mls/hr
    Administration
  2 MILLIUNIT/MIN  
Ropivacaine/Sodium Chloride  400 mg in 200 mls @ 6 mls/hr  07/03/23 10:45 
  Naropin 0.2% 400 Mg/200 Ml Bag  EPIDURAL  
  Q24H RUTHIE  
Ibuprofen  600 mg  07/03/23 14:30  07/03/23 21:21
  Ibuprofen 600 Mg Tablet  PO   600 mg
  Q6H PRN   Administration
  Moderate Pain  
Lidocaine  5 ml  07/03/23 07:16 
  Lidocaine Viscous 2% 15 Ml Topical Solution  TOPICAL  
  ONCE PRN  
  Pain  
Lidocaine  1 ml  07/03/23 07:16 
  Lidocaine Hcl 1% 200 Mg/20 Ml Mdv  INJ  
  ONCE PRN  
  Pain  
Lidocaine  5 ml  07/03/23 10:43 
  Lidocaine Hcl 2% Pf 100 Mg/5 Ml Vial  INJ  
  Q1H PRN  
  Pain  
Methylergonovine Maleate  0.2 mg  07/03/23 07:16 
  Methylergonovine Maleate 0.2 Mg Tablet  PO  
  Q4H PRN  
  Uterine Contractility/Contract  
Methylergonovine Maleate  0.2 mg  07/03/23 07:16 
  Methylergonovine Maleate 0.2 Mg/Ml Ampule  IM  
  ONCE PRN  
  Uterine Contractility/Contract  
Misoprostol  600 mcg  07/03/23 07:16 
  Misoprostol 100 Mcg Tablet  PO  
  ONCE PRN  
  Uterine Bleeding  
Misoprostol  800 mcg  07/03/23 07:16 
  Misoprostol 100 Mcg Tablet  SL  
  ONCE PRN  
  Uterine Bleeding  
Misoprostol  1,000 mcg  07/03/23 07:16 
  Misoprostol 100 Mcg Tablet  MN  
  ONCE PRN  
  Uterine Bleeding  
Nalbuphine HCl  10 mg  07/03/23 07:16 
  Nalbuphine Hcl 10 Mg/Ml Ampule  IV  
  Q3H PRN  
  Pain  
Ondansetron HCl  4 mg  07/03/23 07:16 
  Ondansetron Pf 4 Mg/2 Ml Vial  IV  
  Q6H PRN  
  Nausea And Vomiting  
Ondansetron HCl  4 mg  07/03/23 07:16 
  Ondansetron 4 Mg Rapdis Tablet  SL  
  Q6H PRN  
  Nausea And Vomiting  
Oxytocin  10 unit  07/03/23 07:16 
  Oxytocin 100 Unit/10 Ml Vial  IM  
  ONCE PRN  
  Uterine Bleeding  
Senna  17.2 mg  07/03/23 20:00 
  Sennosides 8.6 Mg Tablet  PO  
  QHS PRN  
  Constipation  
Simethicone  80 mg  07/03/23 14:30 
  Simethicone 80 Mg Tab.Chew  PO  
  QID PRN  
  Abdominal Distention  
Witch Hazel/Glycerin  1 each  07/03/23 14:30 
  Glycerin/Witch Hazel 1 Each Jar  TOPICAL  
  Q1H PRN  
  Pain  

Diet

 Category Date Time Status
 Regular Consistency Diet Diet  07/03/23 Dinner Active

IV Insertion/Site

Date of IV Line Insertion [    07/03/23                                          
left Forearm]                  
IV Insertion Time [left        07:50                                             
Forearm]                       


Neurology

Patient orientation (short     person,place,time,situation                       
list)                          
Patient orientation (short     person,place,time,situation                       
list)                          
[place] coma scale total score 15                                                


Respiratory

Lung sounds [Throughout]       clear                                             
Lung sounds [Throughout]       clear                                             
Oxygen Delivery Method         Room Air                                          
Oxygen Delivery Method         Room Air                                          


Catheter

Date Urinary Catheter Removed  07/03/23

## 2023-07-04 NOTE — P.OBPN_ITS
OB - PN: Subj    
Subjective    
Interval history:     
patient sitting on the couch with significant other.  has no c/o and asks  I   
would like to go home later if thats ok?       
Patient comments: no complaints and pain well controlled    
[place] infant status: doing well    
 feeding status: exclusively bottle feeding    
    
Exam    
Narrative    
Exam Narrative:     
assessment negative     
Constitutional    
                               Vital Signs - 24 hr    
    
    
    
 23    
10:04 23    
10:17 23    
10:32    
     
Temperature       
     
Pulse Rate 59 L 63 54 L    
     
Respiratory Rate       
     
Blood Pressure 134/78 H 123/69 H 115/70    
     
Blood Pressure [Left Arm]       
     
Oxygen Delivery Method       
    
    
    
    
 23    
11:17 23    
11:37 23    
11:41    
     
Temperature       
     
Pulse Rate 54 L 71 76    
     
Respiratory Rate       
     
Blood Pressure 115/72 134/76 H 122/56 H    
     
Blood Pressure [Left Arm]       
     
Oxygen Delivery Method       
    
    
    
    
 23    
11:46 23    
11:54 23    
11:56    
     
Temperature       
     
Pulse Rate 99 H 58 L 71    
     
Respiratory Rate       
     
Blood Pressure 117/57 L 116/60 109/59 L    
     
Blood Pressure [Left Arm]       
     
Oxygen Delivery Method       
    
    
    
    
 23    
12:01 23    
12:06 23    
12:13    
     
Temperature       
     
Pulse Rate 68 73 69    
     
Respiratory Rate       
     
Blood Pressure 101/56 L 96/51 L 114/53 L    
     
Blood Pressure [Left Arm]       
     
Oxygen Delivery Method       
    
    
    
    
 23    
12:16 23    
12:21 23    
12:26    
     
Temperature       
     
Pulse Rate 57 L 60 71    
     
Respiratory Rate       
     
Blood Pressure 108/55 L 110/64 113/57 L    
     
Blood Pressure [Left Arm]       
     
Oxygen Delivery Method       
    
    
    
    
 23    
12:43 23    
12:58 23    
13:13    
     
Temperature       
     
Pulse Rate 65 66 61    
     
Respiratory Rate       
     
Blood Pressure 109/58 L 110/58 L 88/49 L    
     
Blood Pressure [Left Arm]       
     
Oxygen Delivery Method       
    
    
    
    
 23    
13:28 23    
13:44 23    
13:58    
     
Temperature       
     
Pulse Rate 80 76 65    
     
Respiratory Rate       
     
Blood Pressure 115/66 93/52 L 102/53 L    
     
Blood Pressure [Left Arm]       
     
Oxygen Delivery Method       
    
    
    
    
 23    
14:43 23    
14:58 23    
15:13    
     
Temperature       
     
Pulse Rate 73 67 80    
     
Respiratory Rate       
     
Blood Pressure 104/65 114/65 114/63    
     
Blood Pressure [Left Arm]       
     
Oxygen Delivery Method       
    
    
    
    
 23    
15:28 23    
15:43 23    
15:59    
     
Temperature       
     
Pulse Rate 54 L  72    
     
Respiratory Rate       
     
Blood Pressure 108/59 L 108/56 L 108/60    
     
Blood Pressure [Left Arm]       
     
Oxygen Delivery Method       
    
    
    
    
 23    
16:14 23    
16:29 23    
16:43    
     
Temperature       
     
Pulse Rate 60 67 56 L    
     
Respiratory Rate       
     
Blood Pressure 113/59 L 113/79 120/56 H    
     
Blood Pressure [Left Arm]       
     
Oxygen Delivery Method       
    
    
    
    
 23    
23:00 23    
08:06 23    
10:04    
     
Temperature   97.3 F L    
     
Pulse Rate 56 L 49 L     
     
Respiratory Rate       
     
Blood Pressure 132/63 H 116/58 L     
     
Blood Pressure [Left Arm]       
     
Oxygen Delivery Method   Room Air    
    
    
    
    
 23    
23:01 23    
23:01    
     
Temperature 98.2 F     
     
Pulse Rate 56 L     
     
Respiratory Rate 16     
     
Blood Pressure      
     
Blood Pressure [Left Arm] 132/63 H     
     
Oxygen Delivery Method  Room Air    
    
    
    
Documenting provider has reviewed patient's vital signs: yes    
Common normals: no apparent distress    
General appearance: cooperative and comfortable    
Orientation/consciousness: Yes awake, Yes oriented to person, Yes oriented to   
place and Yes oriented to time    
Chest    
Common normals: inspection of chest normal    
Respiratory    
Common normals: normal respiratory effort    
Auscultation: clear to auscultation bilaterally    
Cardio    
Common normals: no JVD, regular rate, regular rhythm and no murmurs    
Rate: regular rate    
Rhythm: regular rhythm    
GI    
Common normals: Normal to inspection, nondistended, normoactive bowel sounds   
present    
Palpation: soft and firm    
    
Common normals: no CVA tenderness    
Psych    
Attitude: calm    
Activity/motor behavior: appropriate eye contact    
Speech: normal speech    
    
Results    
Labs    
Labs:     
                                    Short CBC    
    
    
    
  23 Range/Units    
    
  05:50     
     
WBC  17.1 H  (4.0-11.0)  10^3/uL    
     
Hgb  8.4 L  (12.0-16.0)  g/dL    
     
Hct  26.7 L  (36.0-48.0)  %    
     
Plt Count  266  (150-450)  10^3/uL    
    
    
    
    
OB - PN: A/P    
Postpartum Plan - Vaginal Delivery    
Postpartum day: 1    
Plan: discharge home    
Time Spent with Patient    
Time:     
Total time spent is greater than 50% in coordination of care (as documented) at   
patient's floor/unit and/or counseling patient:    
    
Total time spent with greater than 50% in coordination of care (as documented)   
at patient's floor/unit and/or counseling patient: less than 15 minutes

## 2023-07-04 NOTE — PM.OBDS
DS: Providers
Provider
Date of admission: 
07/03/23 06:54

Primary care physician: 
Jesús Jnoes DO

Admitting clinician: Jesús Jones
Attending physician on admission: Jesús Jones
Consults: 


07/03/23
Consult to Anesthesiology Routine 
 Consulting Provider: Desmond Elias



Attending physician on discharge: SANDY GARCÍA
Discharging clinician: SANDY GARCÍA
Anticipated date of discharge: 07/04/23

DS: Diagnosis
Discharge Diagnosis
(1) Vaginal delivery: 

Plan
discharge home today per patient request 

OB - DS: Summary
Hospital Course
Time spent discussing smoking cessation with patient: 3 to 10 minutes
Peripartum Data - Vaginal Delivery
Laceration description: none
Episiotomy Description: none
Complications
Postpartum complications: none
Infant
Delivery method: spontaneous vaginal delivery
Gender: female
Discharge plan: home
Status at Discharge
Cognitive/behavioral status at discharge: 
normal  no c/o 
Functional status at discharge: independent ambulation
Overall status at discharge: patient is back to baseline
Time Spent with Patient
Time attestation: 
Total time spent providing and/or coordinating discharge services:

Time spent: less than 30 minutes

Exam
Narrative
Exam Narrative: 
patient doing well, states rubra is light, cramping is light to moderate at times.  I did educate patient on cramping and Motrin will help with that.  PVU  Patient was instructed to take iron due to her blood count.  She states she was supposed to 
be taking in during pregnancy but  I didn't do very well taking that.   I did encourage her to start taking it twice daily and she can also use Colace.  PVU and agrees with the plan of care.  
Constitutional
Vital Signs - 24 hr

 07/03/23
10:04 07/03/23
10:17 07/03/23
10:32
Temperature   
Pulse Rate 59 L 63 54 L
Respiratory Rate   
Blood Pressure 134/78 H 123/69 H 115/70
Blood Pressure [Left Arm]   
Oxygen Delivery Method   

 07/03/23
11:17 07/03/23
11:37 07/03/23
11:41
Temperature   
Pulse Rate 54 L 71 76
Respiratory Rate   
Blood Pressure 115/72 134/76 H 122/56 H
Blood Pressure [Left Arm]   
Oxygen Delivery Method   

 07/03/23
11:46 07/03/23
11:54 07/03/23
11:56
Temperature   
Pulse Rate 99 H 58 L 71
Respiratory Rate   
Blood Pressure 117/57 L 116/60 109/59 L
Blood Pressure [Left Arm]   
Oxygen Delivery Method   

 07/03/23
12:01 07/03/23
12:06 07/03/23
12:13
Temperature   
Pulse Rate 68 73 69
Respiratory Rate   
Blood Pressure 101/56 L 96/51 L 114/53 L
Blood Pressure [Left Arm]   
Oxygen Delivery Method   

 07/03/23
12:16 07/03/23
12:21 07/03/23
12:26
Temperature   
Pulse Rate 57 L 60 71
Respiratory Rate   
Blood Pressure 108/55 L 110/64 113/57 L
Blood Pressure [Left Arm]   
Oxygen Delivery Method   

 07/03/23
12:43 07/03/23
12:58 07/03/23
13:13
Temperature   
Pulse Rate 65 66 61
Respiratory Rate   
Blood Pressure 109/58 L 110/58 L 88/49 L
Blood Pressure [Left Arm]   
Oxygen Delivery Method   

 07/03/23
13:28 07/03/23
13:44 07/03/23
13:58
Temperature   
Pulse Rate 80 76 65
Respiratory Rate   
Blood Pressure 115/66 93/52 L 102/53 L
Blood Pressure [Left Arm]   
Oxygen Delivery Method   

 07/03/23
14:43 07/03/23
14:58 07/03/23
15:13
Temperature   
Pulse Rate 73 67 80
Respiratory Rate   
Blood Pressure 104/65 114/65 114/63
Blood Pressure [Left Arm]   
Oxygen Delivery Method   

 07/03/23
15:28 07/03/23
15:43 07/03/23
15:59
Temperature   
Pulse Rate 54 L  72
Respiratory Rate   
Blood Pressure 108/59 L 108/56 L 108/60
Blood Pressure [Left Arm]   
Oxygen Delivery Method   

 07/03/23
16:14 07/03/23
16:29 07/03/23
16:43
Temperature   
Pulse Rate 60 67 56 L
Respiratory Rate   
Blood Pressure 113/59 L 113/79 120/56 H
Blood Pressure [Left Arm]   
Oxygen Delivery Method   

 07/03/23
23:00 07/04/23
08:06 07/03/23
10:04
Temperature   97.3 F L
Pulse Rate 56 L 49 L 
Respiratory Rate   
Blood Pressure 132/63 H 116/58 L 
Blood Pressure [Left Arm]   
Oxygen Delivery Method   Room Air

 07/03/23
23:01 07/03/23
23:01
Temperature 98.2 F 
Pulse Rate 56 L 
Respiratory Rate 16 
Blood Pressure  
Blood Pressure [Left Arm] 132/63 H 
Oxygen Delivery Method  Room Air


Documenting provider has reviewed patient's vital signs: yes
Common normals: no apparent distress
General appearance: cooperative and comfortable
Orientation/consciousness: Yes awake, Yes oriented to person and Yes oriented to place
HENMT
Common normals: normocephalic
Head and scalp: normocephalic
Eye
Common normals: PERRL
Pupil: PERRL
Neck & C-Spine
Common normals: full ROM and no JVD
Chest
Common normals: inspection of chest normal
Respiratory
Common normals: normal respiratory effort
Effort & inspection: able to speak in complete sentences
Auscultation: clear to auscultation bilaterally
Cardio
Common normals: no JVD, regular rate and regular rhythm
Rate: regular rate
Rhythm: regular rhythm
GI
Common normals: Normal to inspection, nondistended, normoactive bowel sounds present
Inspection: normal to inspection
Auscultation: normoactive bowel sounds
Palpation: soft

Common normals: no CVA tenderness
Bladder/kidney exam: no CVA tenderness
OB/external & speculum: deferred
Back & Pelvis
Common normals: no CVA tenderness
Thoracic spine/upper back: normal to inspection
Extremity
Common normals: normal to inspection
Neuro
Common normals: oriented x3
Sensorium/orientation: awake, alert, oriented to person, oriented to place and oriented to time
Speech: speech normal
Psych
Common normals: speech normal
Attitude: calm
Activity/motor behavior: appropriate eye contact
Speech: normal speech

DS: Data
Data Completed and Pending
Labs on day of discharge: 
Labs from last 24 hours

  07/04/23
  05:50
WBC  17.1 H
RBC  3.20 L
Hgb  8.4 L
Hct  26.7 L
MCV  83.4
MCH  26.3 L
MCHC  31.5
RDW  14.5
Plt Count  266
MPV  12.8
Seg Neuts % (Manual)  60.0
Lymphocytes % (Manual)  29.0
Atypical Lymphs % (Man)  4.0
Monocytes % (Manual)  4.0
Eosinophils % (Manual)  3.0
Basophils % (Manual)  0.0 L
Neutrophils # (Manual)  10.26 H
Lymphocytes # (Manual)  4.95 H
Monocytes # (Manual)  0.68
Eosinophils # (Manual)  0.51
Basophils # (Manual)  0.00
Poikilocytosis  3+
Virgil Cells  3+
Schistocytes  2+




Discharge Plan
Discharge
Disposition: Home, Self-Care

Condition: Good

Assessment:
negative assessment FF U/-1

Health Concerns:
none at time of discharge 

Plan of Treatment:
routine follow up with dr Jones in 6 weeks 

Discharge Medications:
New
  ibuprofen 800 mg tablet 
   800 mg PO Q8H PRN (Reason: Moderate Pain) Qty: 30 0RF

Activity: other

Activity Detail: pelvic rest for 6 weeks  


Diet: advance to your usual diet

Forms:  Vaginal Delivery - Discharge, Portal Instructions

Follow Up Appointments: 6 weeks with Dr Jones   


Discharge Date/Time: 07/04/23 17:11

Discharge location: discharge to home

## 2023-07-04 NOTE — PM.OBPN
OB - PN: Subj
Subjective
Interval history: 
patient sitting on the couch with significant other.  has no c/o and asks  I would like to go home later if thats ok?   
Patient comments: no complaints and pain well controlled
 infant status: doing well
 feeding status: exclusively bottle feeding

Exam
Narrative
Exam Narrative: 
assessment negative 
Constitutional
Vital Signs - 24 hr

 23
10:04 23
10:17 23
10:32
Temperature   
Pulse Rate 59 L 63 54 L
Respiratory Rate   
Blood Pressure 134/78 H 123/69 H 115/70
Blood Pressure [Left Arm]   
Oxygen Delivery Method   

 23
11:17 23
11:37 23
11:41
Temperature   
Pulse Rate 54 L 71 76
Respiratory Rate   
Blood Pressure 115/72 134/76 H 122/56 H
Blood Pressure [Left Arm]   
Oxygen Delivery Method   

 23
11:46 23
11:54 23
11:56
Temperature   
Pulse Rate 99 H 58 L 71
Respiratory Rate   
Blood Pressure 117/57 L 116/60 109/59 L
Blood Pressure [Left Arm]   
Oxygen Delivery Method   

 23
12:01 23
12:06 23
12:13
Temperature   
Pulse Rate 68 73 69
Respiratory Rate   
Blood Pressure 101/56 L 96/51 L 114/53 L
Blood Pressure [Left Arm]   
Oxygen Delivery Method   

 23
12:16 23
12:21 23
12:26
Temperature   
Pulse Rate 57 L 60 71
Respiratory Rate   
Blood Pressure 108/55 L 110/64 113/57 L
Blood Pressure [Left Arm]   
Oxygen Delivery Method   

 23
12:43 23
12:58 23
13:13
Temperature   
Pulse Rate 65 66 61
Respiratory Rate   
Blood Pressure 109/58 L 110/58 L 88/49 L
Blood Pressure [Left Arm]   
Oxygen Delivery Method   

 23
13:28 23
13:44 23
13:58
Temperature   
Pulse Rate 80 76 65
Respiratory Rate   
Blood Pressure 115/66 93/52 L 102/53 L
Blood Pressure [Left Arm]   
Oxygen Delivery Method   

 23
14:43 23
14:58 23
15:13
Temperature   
Pulse Rate 73 67 80
Respiratory Rate   
Blood Pressure 104/65 114/65 114/63
Blood Pressure [Left Arm]   
Oxygen Delivery Method   

 23
15:28 23
15:43 23
15:59
Temperature   
Pulse Rate 54 L  72
Respiratory Rate   
Blood Pressure 108/59 L 108/56 L 108/60
Blood Pressure [Left Arm]   
Oxygen Delivery Method   

 23
16:14 23
16:29 23
16:43
Temperature   
Pulse Rate 60 67 56 L
Respiratory Rate   
Blood Pressure 113/59 L 113/79 120/56 H
Blood Pressure [Left Arm]   
Oxygen Delivery Method   

 23
23:00 23
08:06 23
10:04
Temperature   97.3 F L
Pulse Rate 56 L 49 L 
Respiratory Rate   
Blood Pressure 132/63 H 116/58 L 
Blood Pressure [Left Arm]   
Oxygen Delivery Method   Room Air

 23
23:01 23
23:01
Temperature 98.2 F 
Pulse Rate 56 L 
Respiratory Rate 16 
Blood Pressure  
Blood Pressure [Left Arm] 132/63 H 
Oxygen Delivery Method  Room Air


Documenting provider has reviewed patient's vital signs: yes
Common normals: no apparent distress
General appearance: cooperative and comfortable
Orientation/consciousness: Yes awake, Yes oriented to person, Yes oriented to place and Yes oriented to time
Chest
Common normals: inspection of chest normal
Respiratory
Common normals: normal respiratory effort
Auscultation: clear to auscultation bilaterally
Cardio
Common normals: no JVD, regular rate, regular rhythm and no murmurs
Rate: regular rate
Rhythm: regular rhythm
GI
Common normals: Normal to inspection, nondistended, normoactive bowel sounds present
Palpation: soft and firm

Common normals: no CVA tenderness
Psych
Attitude: calm
Activity/motor behavior: appropriate eye contact
Speech: normal speech

Results
Labs
Labs: 
Short CBC

  23 Range/Units
  05:50 
WBC  17.1 H  (4.0-11.0)  10^3/uL
Hgb  8.4 L  (12.0-16.0)  g/dL
Hct  26.7 L  (36.0-48.0)  %
Plt Count  266  (150-450)  10^3/uL



OB - PN: A/P
Postpartum Plan - Vaginal Delivery
Postpartum day: 1
Plan: discharge home
Time Spent with Patient
Time: 
Total time spent is greater than 50% in coordination of care (as documented) at patient's floor/unit and/or counseling patient:

Total time spent with greater than 50% in coordination of care (as documented) at patient's floor/unit and/or counseling patient: less than 15 minutes

## 2023-07-04 NOTE — P.DS_ITS
DS: Providers    
Provider    
Date of admission:     
07/03/23 06:54    
    
Primary care physician:     
Jesús Jones DO    
    
Admitting clinician: Jesús Jones    
Attending physician on admission: Jesús Jones    
Consults:     
                                            
    
07/03/23    
Consult to Anesthesiology Routine     
   Consulting Provider: Desmond Elias    
    
    
    
Attending physician on discharge: SANDY GARCÍA    
Discharging clinician: SANDY GARCÍA    
Anticipated date of discharge: 07/04/23    
    
DS: Diagnosis    
Discharge Diagnosis    
(1) Vaginal delivery:     
    
Plan    
discharge home today per patient request     
    
OB - DS: Summary    
Hospital Course    
Time spent discussing smoking cessation with patient: 3 to 10 minutes    
Peripartum Data - Vaginal Delivery    
Laceration description: none    
Episiotomy Description: none    
Complications    
Postpartum complications: none    
Infant    
Delivery method: spontaneous vaginal delivery    
Gender: female    
Discharge plan: home    
Status at Discharge    
Cognitive/behavioral status at discharge:     
normal  no c/o     
Functional status at discharge: independent ambulation    
Overall status at discharge: patient is back to baseline    
Time Spent with Patient    
Time attestation:     
Total time spent providing and/or coordinating discharge services:    
    
Time spent: less than 30 minutes    
    
Exam    
Narrative    
Exam Narrative:     
patient doing well, states rubra is light, cramping is light to moderate at   
times.  I did educate patient on cramping and Motrin will help with that.  PVU    
Patient was instructed to take iron due to her blood count.  She states she was   
supposed to be taking in during pregnancy but  I didn't do very well taking   
that.   I did encourage her to start taking it twice daily and she can also use   
Colace.  PVU and agrees with the plan of care.      
Constitutional    
                               Vital Signs - 24 hr    
    
    
    
 07/03/23    
10:04 07/03/23    
10:17 07/03/23    
10:32    
     
Temperature       
     
Pulse Rate 59 L 63 54 L    
     
Respiratory Rate       
     
Blood Pressure 134/78 H 123/69 H 115/70    
     
Blood Pressure [Left Arm]       
     
Oxygen Delivery Method       
    
    
    
    
 07/03/23    
11:17 07/03/23    
11:37 07/03/23    
11:41    
     
Temperature       
     
Pulse Rate 54 L 71 76    
     
Respiratory Rate       
     
Blood Pressure 115/72 134/76 H 122/56 H    
     
Blood Pressure [Left Arm]       
     
Oxygen Delivery Method       
    
    
    
    
 07/03/23    
11:46 07/03/23    
11:54 07/03/23    
11:56    
     
Temperature       
     
Pulse Rate 99 H 58 L 71    
     
Respiratory Rate       
     
Blood Pressure 117/57 L 116/60 109/59 L    
     
Blood Pressure [Left Arm]       
     
Oxygen Delivery Method       
    
    
    
    
 07/03/23    
12:01 07/03/23    
12:06 07/03/23    
12:13    
     
Temperature       
     
Pulse Rate 68 73 69    
     
Respiratory Rate       
     
Blood Pressure 101/56 L 96/51 L 114/53 L    
     
Blood Pressure [Left Arm]       
     
Oxygen Delivery Method       
    
    
    
    
 07/03/23    
12:16 07/03/23    
12:21 07/03/23    
12:26    
     
Temperature       
     
Pulse Rate 57 L 60 71    
     
Respiratory Rate       
     
Blood Pressure 108/55 L 110/64 113/57 L    
     
Blood Pressure [Left Arm]       
     
Oxygen Delivery Method       
    
    
    
    
 07/03/23    
12:43 07/03/23    
12:58 07/03/23    
13:13    
     
Temperature       
     
Pulse Rate 65 66 61    
     
Respiratory Rate       
     
Blood Pressure 109/58 L 110/58 L 88/49 L    
     
Blood Pressure [Left Arm]       
     
Oxygen Delivery Method       
    
    
    
    
 07/03/23    
13:28 07/03/23    
13:44 07/03/23    
13:58    
     
Temperature       
     
Pulse Rate 80 76 65    
     
Respiratory Rate       
     
Blood Pressure 115/66 93/52 L 102/53 L    
     
Blood Pressure [Left Arm]       
     
Oxygen Delivery Method       
    
    
    
    
 07/03/23    
14:43 07/03/23    
14:58 07/03/23    
15:13    
     
Temperature       
     
Pulse Rate 73 67 80    
     
Respiratory Rate       
     
Blood Pressure 104/65 114/65 114/63    
     
Blood Pressure [Left Arm]       
     
Oxygen Delivery Method       
    
    
    
    
 07/03/23    
15:28 07/03/23    
15:43 07/03/23    
15:59    
     
Temperature       
     
Pulse Rate 54 L  72    
     
Respiratory Rate       
     
Blood Pressure 108/59 L 108/56 L 108/60    
     
Blood Pressure [Left Arm]       
     
Oxygen Delivery Method       
    
    
    
    
 07/03/23    
16:14 07/03/23    
16:29 07/03/23    
16:43    
     
Temperature       
     
Pulse Rate 60 67 56 L    
     
Respiratory Rate       
     
Blood Pressure 113/59 L 113/79 120/56 H    
     
Blood Pressure [Left Arm]       
     
Oxygen Delivery Method       
    
    
    
    
 07/03/23    
23:00 07/04/23    
08:06 07/03/23    
10:04    
     
Temperature   97.3 F L    
     
Pulse Rate 56 L 49 L     
     
Respiratory Rate       
     
Blood Pressure 132/63 H 116/58 L     
     
Blood Pressure [Left Arm]       
     
Oxygen Delivery Method   Room Air    
    
    
    
    
 07/03/23    
23:01 07/03/23    
23:01    
     
Temperature 98.2 F     
     
Pulse Rate 56 L     
     
Respiratory Rate 16     
     
Blood Pressure      
     
Blood Pressure [Left Arm] 132/63 H     
     
Oxygen Delivery Method  Room Air    
    
    
    
Documenting provider has reviewed patient's vital signs: yes    
Common normals: no apparent distress    
General appearance: cooperative and comfortable    
Orientation/consciousness: Yes awake, Yes oriented to person and Yes oriented to  
place    
HENMT    
Common normals: normocephalic    
Head and scalp: normocephalic    
Eye    
Common normals: PERRL    
Pupil: PERRL    
Neck & C-Spine    
Common normals: full ROM and no JVD    
Chest    
Common normals: inspection of chest normal    
Respiratory    
Common normals: normal respiratory effort    
Effort & inspection: able to speak in complete sentences    
Auscultation: clear to auscultation bilaterally    
Cardio    
Common normals: no JVD, regular rate and regular rhythm    
Rate: regular rate    
Rhythm: regular rhythm    
GI    
Common normals: Normal to inspection, nondistended, normoactive bowel sounds   
present    
Inspection: normal to inspection    
Auscultation: normoactive bowel sounds    
Palpation: soft    
    
Common normals: no CVA tenderness    
Bladder/kidney exam: no CVA tenderness    
OB/external & speculum: deferred    
Back & Pelvis    
Common normals: no CVA tenderness    
Thoracic spine/upper back: normal to inspection    
Extremity    
Common normals: normal to inspection    
Neuro    
Common normals: oriented x3    
Sensorium/orientation: awake, alert, oriented to person, oriented to place and   
oriented to time    
Speech: speech normal    
Psych    
Common normals: speech normal    
Attitude: calm    
Activity/motor behavior: appropriate eye contact    
Speech: normal speech    
    
DS: Data    
Data Completed and Pending    
Labs on day of discharge:     
                             Labs from last 24 hours    
    
    
    
  07/04/23    
    
  05:50    
     
WBC  17.1 H    
     
RBC  3.20 L    
     
Hgb  8.4 L    
     
Hct  26.7 L    
     
MCV  83.4    
     
MCH  26.3 L    
     
MCHC  31.5    
     
RDW  14.5    
     
Plt Count  266    
     
MPV  12.8    
     
Seg Neuts % (Manual)  60.0    
     
Lymphocytes % (Manual)  29.0    
     
Atypical Lymphs % (Man)  4.0    
     
Monocytes % (Manual)  4.0    
     
Eosinophils % (Manual)  3.0    
     
Basophils % (Manual)  0.0 L    
     
Neutrophils # (Manual)  10.26 H    
     
Lymphocytes # (Manual)  4.95 H    
     
Monocytes # (Manual)  0.68    
     
Eosinophils # (Manual)  0.51    
     
Basophils # (Manual)  0.00    
     
Poikilocytosis  3+    
     
[place] Cells  3+    
     
Schistocytes  2+    
    
    
    
    
    
Discharge Plan    
Discharge    
Disposition: Home, Self-Care    
    
Condition: Good    
    
Assessment:    
negative assessment FF U/-1    
    
Health Concerns:    
none at time of discharge     
    
Plan of Treatment:    
routine follow up with dr Jones in 6 weeks     
    
Discharge Medications:    
New    
  ibuprofen 800 mg tablet     
   800 mg PO Q8H PRN (Reason: Moderate Pain) Qty: 30 0RF    
    
Activity: other    
    
Activity Detail: pelvic rest for 6 weeks      
    
    
Diet: advance to your usual diet    
    
Forms:  Vaginal Delivery - Discharge, Portal Instructions    
    
Follow Up Appointments: 6 weeks with Dr Jones       
    
    
Discharge Date/Time: 07/04/23 17:11    
    
Discharge location: discharge to home